# Patient Record
Sex: FEMALE | Race: WHITE | Employment: OTHER | RURAL
[De-identification: names, ages, dates, MRNs, and addresses within clinical notes are randomized per-mention and may not be internally consistent; named-entity substitution may affect disease eponyms.]

---

## 2017-08-24 ENCOUNTER — OFFICE VISIT (OUTPATIENT)
Dept: CARDIOLOGY CLINIC | Age: 62
End: 2017-08-24

## 2017-08-24 VITALS
HEART RATE: 61 BPM | OXYGEN SATURATION: 98 % | HEIGHT: 67 IN | WEIGHT: 233 LBS | RESPIRATION RATE: 18 BRPM | BODY MASS INDEX: 36.57 KG/M2 | DIASTOLIC BLOOD PRESSURE: 82 MMHG | SYSTOLIC BLOOD PRESSURE: 124 MMHG

## 2017-08-24 DIAGNOSIS — K21.00 REFLUX ESOPHAGITIS: ICD-10-CM

## 2017-08-24 DIAGNOSIS — I10 ESSENTIAL HYPERTENSION, BENIGN: ICD-10-CM

## 2017-08-24 DIAGNOSIS — E78.2 MIXED HYPERLIPIDEMIA: ICD-10-CM

## 2017-08-24 DIAGNOSIS — I25.10 ATHEROSCLEROSIS OF NATIVE CORONARY ARTERY OF NATIVE HEART WITHOUT ANGINA PECTORIS: Primary | ICD-10-CM

## 2017-08-24 DIAGNOSIS — Z98.61 S/P PTCA (PERCUTANEOUS TRANSLUMINAL CORONARY ANGIOPLASTY): ICD-10-CM

## 2017-08-24 RX ORDER — ACYCLOVIR 400 MG/1
400 TABLET ORAL 2 TIMES DAILY
COMMUNITY

## 2017-08-24 NOTE — PROGRESS NOTES
Verified patient with two patient identifiers. Medications reviewed/approved by Dr. Natali Quintanilla.

## 2017-08-24 NOTE — MR AVS SNAPSHOT
Visit Information Date & Time Provider Department Dept. Phone Encounter #  
 8/24/2017 10:00 AM Jessica Roth, [address] [place] [address] [facility] [phone] [id number] Follow-up Instructions Return in about 1 year (around 8/24/2018). Follow-up and Disposition History Upcoming Health Maintenance Date Due Hepatitis C Screening 1955 DTaP/Tdap/Td series (1 - Tdap) 10/20/1976 PAP AKA CERVICAL CYTOLOGY 10/20/1976 BREAST CANCER SCRN MAMMOGRAM 10/20/2005 FOBT Q 1 YEAR AGE 50-75 10/20/2005 ZOSTER VACCINE AGE 60> 8/20/2015 INFLUENZA AGE 9 TO ADULT 8/1/2017 Allergies as of 8/24/2017  Review Complete On: 8/24/2017 By: Jessica Roth MD  
 No Known Allergies Current Immunizations  Never Reviewed No immunizations on file. Not reviewed this visit You Were Diagnosed With   
  
 Codes Comments Atherosclerosis of native coronary artery of native heart without angina pectoris    -  Primary ICD-10-CM: I25.10 ICD-9-CM: 414.01 Essential hypertension, benign     ICD-10-CM: I10 
ICD-9-CM: 401.1 Mixed hyperlipidemia     ICD-10-CM: E78.2 ICD-9-CM: 272.2 S/P PTCA (percutaneous transluminal coronary angioplasty)     ICD-10-CM: Z98.61 ICD-9-CM: V45.82 Reflux esophagitis     ICD-10-CM: K21.0 ICD-9-CM: 530.11 Vitals BP Pulse Resp Height(growth percentile) Weight(growth percentile) SpO2  
 124/82 (BP 1 Location: Left arm, BP Patient Position: Sitting) 61 18 5' 7\" (1.702 m) 233 lb (105.7 kg) 98% BMI Smoking Status 36.49 kg/m2 Former Smoker Vitals History BMI and BSA Data Body Mass Index Body Surface Area  
 36.49 kg/m 2 2.24 m 2 [facility]erred Pharm[facility] Name Phone [facility] [phone] Your Updated Medication List  
  
   
This list is accurate as of: 8/24/17 10:42 AM.  Always use your most recent med list.  
  
  
  
  
 acyclovir 400 mg tablet Commonly known as:  ZOVIRAX Take 400 mg by mouth two (2) times a day. aspirin delayed-release 81 mg tablet Take  by mouth daily. CALCIUM 600 + D(3) PO Take  by mouth. HELIOCARE PO Take  by mouth. hydroxychloroquine 200 mg tablet Commonly known as:  PLAQUENIL Take 200 mg by mouth two (2) times a day. LIPITOR 40 mg tablet Generic drug:  atorvastatin Take 60 mg by mouth daily. metoprolol succinate 25 mg XL tablet Commonly known as:  TOPROL-XL Take  by mouth daily. multivitamin tablet Commonly known as:  ONE A DAY Take 1 Tab by mouth daily. OMEGA 3 PO Take 1,200 mg by mouth four (4) times daily. PRILOSEC PO Take 20 mg by mouth. VITAMIN D3 1,000 unit Cap Generic drug:  cholecalciferol Take  by mouth daily. ZETIA 10 mg tablet Generic drug:  ezetimibe Take  by mouth. ZOLOFT 50 mg tablet Generic drug:  sertraline Take  by mouth daily. We Performed the Following AMB POC EKG ROUTINE W/ 12 LEADS, INTER & REP [38347 CPT(R)] Follow-up Instructions Return in about 1 year (around 8/24/2018). Introducing [facility]! Karely Talamantes introduces [employer] patient portal. Now you can access parts of your medical record, email your doctor's office, and request medication refills online. 1. In your internet browser, go to [email or web address] 2. Click on the First Time User? Click Here link in the Sign In box. You will see the New Member Sign Up page. 3. Enter your [employer] Access Code exactly as it appears below. You will not need to use this code after youve completed the sign-up process. If you do not sign up before the expiration date, you must request a new code. · [employer] Access Code: [email or web address] Expires: 9/17/2017  1:49 PM 
 
4.  Enter the last four digits of your Social Security Number (xxxx) and Date of Birth (mm/dd/yyyy) as indicated and click Submit. You will be taken to the next sign-up page. 5. Create a [employer] ID. This will be your [employer] login ID and cannot be changed, so think of one that is secure and easy to remember. 6. Create a [employer] password. You can change your password at any time. 7. Enter your Password Reset Question and Answer. This can be used at a later time if you forget your password. 8. Enter your e-mail address. You will receive e-mail notification when new information is available in [address] 9. Click Sign Up. You can now view and download portions of your medical record. 10. Click the Download Summary menu link to download a portable copy of your medical information. If you have questions, please visit the Frequently Asked Questions section of the [employer] website. Remember, [employer] is NOT to be used for urgent needs. For medical emergencies, dial 911. Now available from your iPhone and Android! Please provide this summary of care documentation to your next provider. Your primary care clinician is listed as [facility] B [facility]. If you have any questions after today's visit, please call [phone].

## 2017-08-24 NOTE — PROGRESS NOTES
Marlon Coppola is a 64 y.o. female is here for routine f/u. No CV sx or complaints. Continues to see PCP. S/p cath/PCI to RCA 2003, doing well since without any CV sx or complaints. Lipids at target--5/17/17 chol 117, HDL 52, LDL 40, , CMP and CBC normal.  Last stress MPI 2006 normal.  Stress treadmill EKG 9/2/16 Dinh 6:53,  (89% apm), no sx/EKG changes. The patient denies chest pain/ shortness of breath, orthopnea, PND, LE edema, palpitations, syncope, presyncope or fatigue. Patient Active Problem List    Diagnosis Date Noted    Reflux esophagitis 03/22/2011    Mixed hyperlipidemia 03/22/2011    Essential hypertension, benign 03/22/2011    Coronary atherosclerosis of native coronary artery 03/22/2011 June B MD Rob  Past Medical History:   Diagnosis Date    Coronary atherosclerosis of native coronary artery 3/22/2011    Essential hypertension, benign 3/22/2011    GERD (gastroesophageal reflux disease)     Mixed hyperlipidemia 3/22/2011    Reflux esophagitis 3/22/2011      Past Surgical History:   Procedure Laterality Date    HX HEART CATHETERIZATION  2003    RCA >90% stenosis    HX PTCA  2003    stent to RCA. Hepacoat stent    STRESS TEST MYOVIEW  2006    no fixed or reversible deficits. Gated EF (%): 65.      No Known Allergies   No family history on file. Social History     Social History    Marital status:      Spouse name: N/A    Number of children: N/A    Years of education: N/A     Occupational History    Not on file. Social History Main Topics    Smoking status: Former Smoker     Quit date: 6/7/1990    Smokeless tobacco: Not on file    Alcohol use Not on file    Drug use: Not on file    Sexual activity: Not on file     Other Topics Concern    Not on file     Social History Narrative      Current Outpatient Prescriptions   Medication Sig    acyclovir (ZOVIRAX) 400 mg tablet Take 400 mg by mouth two (2) times a day.     aspirin delayed-release 81 mg tablet Take  by mouth daily.  CALCIUM CARBONATE/VITAMIN D3 (CALCIUM 600 + D,3, PO) Take  by mouth.  cholecalciferol (VITAMIN D3) 1,000 unit cap Take  by mouth daily.  multivitamin (ONE A DAY) tablet Take 1 Tab by mouth daily.  hydroxychloroquine (PLAQUENIL) 200 mg tablet Take 200 mg by mouth two (2) times a day.  atorvastatin (LIPITOR) 40 mg tablet Take 60 mg by mouth daily.  POLYPODIUM LEUCOTOMOS EXTRACT (HELIOCARE PO) Take  by mouth.  metoprolol-XL (TOPROL-XL) 25 mg XL tablet Take  by mouth daily.  ezetimibe (ZETIA) 10 mg tablet Take  by mouth.  OMEGA-3 FATTY ACIDS (OMEGA 3 PO) Take 1,200 mg by mouth four (4) times daily.  sertraline (ZOLOFT) 50 mg tablet Take  by mouth daily.  OMEPRAZOLE (PRILOSEC PO) Take 20 mg by mouth. No current facility-administered medications for this visit. Review of Symptoms:    CONST  No weight change. No fever, chills, sweats    ENT No visual changes, URI sx, sore throat    CV  See HPI   RESP  No cough, or sputum, wheezing. Also see HPI   GI  No abdominal pain or change in bowel habits. No heartburn or dysphagia. No melena or rectal bleeding.   No dysuria, urgency, frequency, hematuria   MSKEL  No joint pain, swelling. No muscle pain. SKIN  No rash or lesions. NEURO  No headache, syncope, or seizure. No weakness, loss of sensation, or paresthesias. PSYCH  No low mood or depression  No anxiety. HE/LYMPH  No easy bruising, abnormal bleeding, or enlarged glands.         Physical ExamPhysical Exam:    Visit Vitals    /82 (BP 1 Location: Left arm, BP Patient Position: Sitting)    Pulse 61    Resp 18    Ht 5' 7\" (1.702 m)    Wt 233 lb (105.7 kg)    SpO2 98%    BMI 36.49 kg/m2     Gen: NAD  HEENT:  PERRL, throat clear  Neck: no adenopathy, no thyromegaly, no JVD   Heart:  Regular,Nl S1S2,  no murmur, gallop or rub.   Lungs:  clear  Abdomen:   Soft, non-tender, bowel sounds are active.   Extremities:  No edema  Pulse: symmetric  Neuro: A&O times 3, No focal neuro deficits    Cardiographics    ECG: sinus zaida 51, wnl    Labs:   Lab Results   Component Value Date/Time    Sodium 142 06/13/2017 09:34 AM    Potassium 4.4 06/13/2017 09:34 AM    Chloride 106 06/13/2017 09:34 AM    CO2 28 06/13/2017 09:34 AM    Anion gap 8 06/13/2017 09:34 AM    Glucose 103 06/13/2017 09:34 AM    BUN 16 06/13/2017 09:34 AM    Creatinine 0.97 06/13/2017 09:34 AM    BUN/Creatinine ratio 16 06/13/2017 09:34 AM    GFR est AA >60 06/13/2017 09:34 AM    GFR est non-AA 58 06/13/2017 09:34 AM    Calcium 8.8 06/13/2017 09:34 AM    Bilirubin, total 0.5 06/13/2017 09:34 AM    AST (SGOT) 20 06/13/2017 09:34 AM    Alk. phosphatase 68 06/13/2017 09:34 AM    Protein, total 7.4 06/13/2017 09:34 AM    Albumin 3.7 06/13/2017 09:34 AM    Globulin 3.7 06/13/2017 09:34 AM    A-G Ratio 1.0 06/13/2017 09:34 AM    ALT (SGPT) 33 06/13/2017 09:34 AM         Assessment:         Patient Active Problem List    Diagnosis Date Noted    Reflux esophagitis 03/22/2011    Mixed hyperlipidemia 03/22/2011    Essential hypertension, benign 03/22/2011    Coronary atherosclerosis of native coronary artery 03/22/2011     . No CV sx or complaints. Continues to see PCP. S/p cath/PCI to RCA 2003, doing well since without any CV sx or complaints. Lipids at target--5/17/17 chol 117, HDL 52, LDL 40, , CMP and CBC normal.  Last stress MPI 2006 normal.  Stress treadmill EKG 9/2/16 Dinh 6:53,  (89% apm), no sx/EKG changes. Plan:     Doing well with no adverse cardiac symptoms. Lipids and labs followed by PCP. Continue current care and f/u in 12 months.     Lina Burton MD

## 2018-08-24 ENCOUNTER — OFFICE VISIT (OUTPATIENT)
Dept: CARDIOLOGY CLINIC | Age: 63
End: 2018-08-24

## 2018-08-24 VITALS
BODY MASS INDEX: 36.26 KG/M2 | HEART RATE: 49 BPM | RESPIRATION RATE: 18 BRPM | OXYGEN SATURATION: 97 % | SYSTOLIC BLOOD PRESSURE: 110 MMHG | HEIGHT: 67 IN | DIASTOLIC BLOOD PRESSURE: 80 MMHG | WEIGHT: 231 LBS

## 2018-08-24 DIAGNOSIS — Z98.61 S/P PTCA (PERCUTANEOUS TRANSLUMINAL CORONARY ANGIOPLASTY): ICD-10-CM

## 2018-08-24 DIAGNOSIS — E78.2 MIXED HYPERLIPIDEMIA: ICD-10-CM

## 2018-08-24 DIAGNOSIS — K21.00 REFLUX ESOPHAGITIS: ICD-10-CM

## 2018-08-24 DIAGNOSIS — I25.10 ATHEROSCLEROSIS OF NATIVE CORONARY ARTERY OF NATIVE HEART WITHOUT ANGINA PECTORIS: Primary | ICD-10-CM

## 2018-08-24 DIAGNOSIS — I10 ESSENTIAL HYPERTENSION, BENIGN: ICD-10-CM

## 2018-08-24 NOTE — PROGRESS NOTES
Rudi Montenegro is a 58 y.o. female is here for routine f/u. No CV sx or complaints. Continues to see PCP. S/p cath/PCI to RCA 2003, doing well since without any CV sx or complaints.  Lipids at target--5/17/17 chol 117, HDL 52, LDL 40, , CMP and CBC normal.  Last stress MPI 2006 normal.  Stress treadmill EKG 9/2/16 Dinh 6:53,  (89% apm), no sx/EKG changes. Last seen here a year ago, continues to see Dr. Refugio Bowden. Labs 5/23/18 with CBC, CMP normal, [id number], LDL38, HDL58, . The patient denies chest pain/ shortness of breath, orthopnea, PND, LE edema, palpitations, syncope, presyncope or fatigue. Patient Active Problem List    Diagnosis Date Noted    Reflux esophagitis 03/22/2011    Mixed hyperlipidemia 03/22/2011    Essential hypertension, benign 03/22/2011    Coronary atherosclerosis of native coronary artery 03/22/2011 June Goyo Hebert MD  Past Medical History:   Diagnosis Date    Coronary atherosclerosis of native coronary artery 3/22/2011    Essential hypertension, benign 3/22/2011    GERD (gastroesophageal reflux disease)     Menopause     age 46    Mixed hyperlipidemia 3/22/2011    Reflux esophagitis 3/22/2011      Past Surgical History:   Procedure Laterality Date    HX BREAST BIOPSY Left     2002    HX HEART CATHETERIZATION  2003    RCA >90% stenosis    HX PARTIAL HYSTERECTOMY      age 39    HX PTCA  2003    stent to RCA. Hepacoat stent    STRESS TEST MYOVIEW  2006    no fixed or reversible deficits. Gated EF (%): 65.      No Known Allergies   No family history on file. Social History     Social History    Marital status:      Spouse name: N/A    Number of children: N/A    Years of education: N/A     Occupational History    Not on file.      Social History Main Topics    Smoking status: Former Smoker     Quit date: 6/7/1985    Smokeless tobacco: Never Used    Alcohol use No    Drug use: No    Sexual activity: Not Currently     Other Topics Concern    Not on file     Social History Narrative      Current Outpatient Prescriptions   Medication Sig    HYDROcodone-acetaminophen (NORCO) 5-325 mg per tablet Take 1 Tab by mouth every four (4) hours as needed for Pain. Max Daily Amount: 6 Tabs.  acyclovir (ZOVIRAX) 400 mg tablet Take 400 mg by mouth two (2) times a day.  aspirin delayed-release 81 mg tablet Take  by mouth daily.  CALCIUM CARBONATE/VITAMIN D3 (CALCIUM 600 + D,3, PO) Take  by mouth.  cholecalciferol (VITAMIN D3) 1,000 unit cap Take  by mouth daily.  multivitamin (ONE A DAY) tablet Take 1 Tab by mouth daily.  hydroxychloroquine (PLAQUENIL) 200 mg tablet Take 200 mg by mouth daily.  atorvastatin (LIPITOR) 40 mg tablet Take 60 mg by mouth daily.  POLYPODIUM LEUCOTOMOS EXTRACT (HELIOCARE PO) Take  by mouth.  metoprolol-XL (TOPROL-XL) 25 mg XL tablet Take  by mouth nightly.  ezetimibe (ZETIA) 10 mg tablet Take  by mouth.  OMEGA-3 FATTY ACIDS (OMEGA 3 PO) Take 2,400 mg by mouth two (2) times a day.  sertraline (ZOLOFT) 50 mg tablet Take  by mouth daily.  OMEPRAZOLE (PRILOSEC PO) Take 20 mg by mouth. No current facility-administered medications for this visit. Review of Symptoms:    CONST  No weight change. No fever, chills, sweats    ENT No visual changes, URI sx, sore throat    CV  See HPI   RESP  No cough, or sputum, wheezing. Also see HPI   GI  No abdominal pain or change in bowel habits. No heartburn or dysphagia. No melena or rectal bleeding.   No dysuria, urgency, frequency, hematuria   MSKEL  No joint pain, swelling. No muscle pain. SKIN  No rash or lesions. NEURO  No headache, syncope, or seizure. No weakness, loss of sensation, or paresthesias. PSYCH  No low mood or depression  No anxiety. HE/LYMPH  No easy bruising, abnormal bleeding, or enlarged glands. Physical ExamPhysical Exam:    There were no vitals taken for this visit.   Gen: NAD  HEENT:  PERSHALONDA throat clear  Neck: no adenopathy, no thyromegaly, no JVD   Heart:  Regular,Nl S1S2,  no murmur, gallop or rub.   Lungs:  clear  Abdomen:   Soft, non-tender, bowel sounds are active.   Extremities:  No edema  Pulse: symmetric  Neuro: A&O times 3, No focal neuro deficits    Cardiographics    ECG: NSR, no acute changes    Labs:   Lab Results   Component Value Date/Time    Sodium 140 04/10/2018 09:35 AM    Sodium 141 11/09/2017 08:44 AM    Sodium 142 06/13/2017 09:34 AM    Potassium 4.4 04/10/2018 09:35 AM    Potassium 4.8 11/09/2017 08:44 AM    Potassium 4.4 06/13/2017 09:34 AM    Chloride 104 04/10/2018 09:35 AM    Chloride 105 11/09/2017 08:44 AM    Chloride 106 06/13/2017 09:34 AM    CO2 25 04/10/2018 09:35 AM    CO2 28 11/09/2017 08:44 AM    CO2 28 06/13/2017 09:34 AM    Anion gap 11 04/10/2018 09:35 AM    Anion gap 8 11/09/2017 08:44 AM    Anion gap 8 06/13/2017 09:34 AM    Glucose 102 (H) 04/10/2018 09:35 AM    Glucose 103 (H) 11/09/2017 08:44 AM    Glucose 103 (H) 06/13/2017 09:34 AM    BUN 19 (H) 04/10/2018 09:35 AM    BUN 12 11/09/2017 08:44 AM    BUN 16 06/13/2017 09:34 AM    Creatinine 0.85 04/10/2018 09:35 AM    Creatinine 0.89 11/09/2017 08:44 AM    Creatinine 0.97 06/13/2017 09:34 AM    BUN/Creatinine ratio 22 (H) 04/10/2018 09:35 AM    BUN/Creatinine ratio 13 11/09/2017 08:44 AM    BUN/Creatinine ratio 16 06/13/2017 09:34 AM    GFR est AA >60 04/10/2018 09:35 AM    GFR est AA >60 11/09/2017 08:44 AM    GFR est AA >60 06/13/2017 09:34 AM    GFR est non-AA >60 04/10/2018 09:35 AM    GFR est non-AA >60 11/09/2017 08:44 AM    GFR est non-AA 58 (L) 06/13/2017 09:34 AM    Calcium 8.9 04/10/2018 09:35 AM    Calcium 9.2 11/09/2017 08:44 AM    Calcium 8.8 06/13/2017 09:34 AM    Bilirubin, total 0.5 04/10/2018 09:35 AM    Bilirubin, total 0.5 11/09/2017 08:44 AM    Bilirubin, total 0.5 06/13/2017 09:34 AM    AST (SGOT) 18 04/10/2018 09:35 AM    AST (SGOT) 20 11/09/2017 08:44 AM    AST (SGOT) 20 06/13/2017 09:34 AM    Alk. phosphatase 73 04/10/2018 09:35 AM    Alk. phosphatase 71 11/09/2017 08:44 AM    Alk. phosphatase 68 06/13/2017 09:34 AM    Protein, total 7.6 04/10/2018 09:35 AM    Protein, total 7.3 11/09/2017 08:44 AM    Protein, total 7.4 06/13/2017 09:34 AM    Albumin 3.9 04/10/2018 09:35 AM    Albumin 3.8 11/09/2017 08:44 AM    Albumin 3.7 06/13/2017 09:34 AM    Globulin 3.7 04/10/2018 09:35 AM    Globulin 3.5 11/09/2017 08:44 AM    Globulin 3.7 06/13/2017 09:34 AM    A-G Ratio 1.1 04/10/2018 09:35 AM    A-G Ratio 1.1 11/09/2017 08:44 AM    A-G Ratio 1.0 (L) 06/13/2017 09:34 AM    ALT (SGPT) 38 04/10/2018 09:35 AM    ALT (SGPT) 32 11/09/2017 08:44 AM    ALT (SGPT) 33 06/13/2017 09:34 AM     No results found for: CPK, CPKX, CPX  No results found for: CHOL, CHOLX, CHLST, CHOLV, 974125, HDL, LDL, LDLC, DLDLP, TGLX, TRIGL, TRIGP, CHHD, CHHDX  No results found for this or any previous visit. Assessment:         Patient Active Problem List    Diagnosis Date Noted    Reflux esophagitis 03/22/2011    Mixed hyperlipidemia 03/22/2011    Essential hypertension, benign 03/22/2011    Coronary atherosclerosis of native coronary artery 03/22/2011     No CV sx or complaints. Continues to see PCP. S/p cath/PCI to RCA 2003, doing well since without any CV sx or complaints.  Lipids at target--5/17/17 chol 117, HDL 52, LDL 40, , CMP and CBC normal.  Last stress MPI 2006 normal.  Stress treadmill EKG 9/2/16 Dinh 6:53,  (89% apm), no sx/EKG changes. Plan:     Doing well with no adverse cardiac symptoms. Bradycardic but feels great, so not concerned  Can reduce the Atorvastatin to 40mg (now on 60mg), fishoil to 2 caps/day  Lipids and labs followed by PCP. Continue current care and f/u in 12 months.     Hellen Bailey MD

## 2018-08-24 NOTE — MR AVS SNAPSHOT
[address] [facility] 
 
 
 [address] [facility] [address] [address] [phone] Patient: Deloris Samayoa MRN: [id number] KCA:[id number] Visit Information Date & Time Provider Department Dept. Phone Encounter #  
 8/24/2018  9:20 AM Marv Rahman, [address] [facility] [phone] [id number] Follow-up Instructions Return in about 1 year (around 8/24/2019). Follow-up and Disposition History Your Appointments 3/4/2019 10:40 AM  
ESTABLISHED PATIENT with Marv Rahman MD  
[address] [facility]) Appt Note: 6 mo fu $cp  
 [address] [facility] [address] [phone]  
  
   
 [address] [place] [address] Upcoming Health Maintenance Date Due Hepatitis C Screening 1955 DTaP/Tdap/Td series (1 - Tdap) 10/20/1976 PAP AKA CERVICAL CYTOLOGY 10/20/1976 FOBT Q 1 YEAR AGE 50-75 10/20/2005 ZOSTER VACCINE AGE 60> 8/20/2015 Influenza Age 5 to Adult 8/1/2018 BREAST CANCER SCRN MAMMOGRAM 11/9/2019 Allergies as of 8/24/2018  Review Complete On: 8/24/2018 By: Aguila Sam LPN No Known Allergies Current Immunizations  Never Reviewed No immunizations on file. Not reviewed this visit You Were Diagnosed With   
  
 Codes Comments Atherosclerosis of native coronary artery of native heart without angina pectoris    -  Primary ICD-10-CM: I25.10 ICD-9-CM: 414.01 Essential hypertension, benign     ICD-10-CM: I10 
ICD-9-CM: 401.1 Mixed hyperlipidemia     ICD-10-CM: E78.2 ICD-9-CM: 272.2 S/P PTCA (percutaneous transluminal coronary angioplasty)     ICD-10-CM: Z98.61 ICD-9-CM: V45.82 Reflux esophagitis     ICD-10-CM: K21.0 ICD-9-CM: 530.11 Vitals BP Pulse Resp Height(growth percentile) Weight(growth percentile) SpO2 110/80 (BP 1 Location: Left arm, BP Patient Position: Sitting) (!) 49 18 5' 7\" (1.702 m) 231 lb (104.8 kg) 97% BMI OB Status Smoking Status 36.18 kg/m2 Hysterectomy Former Smoker Vitals History BMI and BSA Data Body Mass Index Body Surface Area  
 36.18 kg/m 2 2.23 m 2 Preferred Pharmacy Pharmacy Name Phone [facility], [facility] [phone] Your Updated Medication List  
  
   
This list is accurate as of 8/24/18 10:15 AM.  Always use your most recent med list.  
  
  
  
  
 acyclovir 400 mg tablet Commonly known as:  ZOVIRAX Take 400 mg by mouth two (2) times a day. aspirin delayed-release 81 mg tablet Take  by mouth daily. CALCIUM 600 + D(3) PO Take  by mouth. HELIOCARE PO Take  by mouth. hydroxychloroquine 200 mg tablet Commonly known as:  PLAQUENIL Take 200 mg by mouth daily. LIPITOR 40 mg tablet Generic drug:  atorvastatin Take 60 mg by mouth daily. metoprolol succinate 25 mg XL tablet Commonly known as:  TOPROL-XL Take  by mouth nightly. multivitamin tablet Commonly known as:  ONE A DAY Take 1 Tab by mouth daily. OMEGA 3 PO Take 2,400 mg by mouth two (2) times a day. PRILOSEC PO Take 20 mg by mouth. VITAMIN D3 1,000 unit Cap Generic drug:  cholecalciferol Take 2,000 Units by mouth daily. ZETIA 10 mg tablet Generic drug:  ezetimibe Take  by mouth. ZOLOFT 50 mg tablet Generic drug:  sertraline Take  by mouth daily. We Performed the Following AMB POC EKG ROUTINE W/ 12 LEADS, INTER & REP [02533 CPT(R)] Follow-up Instructions Return in about 1 year (around 8/24/2019). Patient Instructions Can reduce the Atorvastatin to 40mg (now on 60mg), fishoil to 2 caps/day Introducing [facility] & HEALTH SERVICES! [employer] introduces [employer] patient portal. Now you can access parts of your medical record, email your doctor's office, and request medication refills online. 1. In your internet browser, go to [email or web address] 2. Click on the First Time User? Click Here link in the Sign In box. You will see the New Member Sign Up page. 3. Enter your [employer] Access Code exactly as it appears below. You will not need to use this code after youve completed the sign-up process. If you do not sign up before the expiration date, you must request a new code. · [employer] Access Code: [email or web address] Expires: 11/22/2018 10:13 AM 
 
4. Enter the last four digits of your Social Security Number (xxxx) and Date of Birth (mm/dd/yyyy) as indicated and click Submit. You will be taken to the next sign-up page. 5. Create a [employer] ID. This will be your [employer] login ID and cannot be changed, so think of one that is secure and easy to remember. 6. Create a [employer] password. You can change your password at any time. 7. Enter your Password Reset Question and Answer. This can be used at a later time if you forget your password. 8. Enter your e-mail address. You will receive e-mail notification when new information is available in [address] 9. Click Sign Up. You can now view and download portions of your medical record. 10. Click the Download Summary menu link to download a portable copy of your medical information. If you have questions, please visit the Frequently Asked Questions section of the [employer] website. Remember, [employer] is NOT to be used for urgent needs. For medical emergencies, dial 911. Now available from your iPhone and Android! Please provide this summary of care documentation to your next provider. Your primary care clinician is listed as June B Daffe. If you have any questions after today's visit, please call [phone].

## 2018-08-24 NOTE — PROGRESS NOTES
Verified patient with two patient identifiers. Medications reviewed/approved by Dr. Arabella Grier. Verbal from Dr. Arabella Grier to remove the medications that were deleted during the visit. Medication(s) removed: Hydrocodone    Chief Complaint   Patient presents with    Coronary Artery Disease     6 month follow up    Hypertension    Cholesterol Problem     1. Have you been to the ER, urgent care clinic since your last visit? Hospitalized since your last visit? 10/13/2017 [facility] er sprain ankle    2. Have you seen or consulted any other health care providers outside of the [facility] since your last visit? Include any pap smears or colon screening. Yes, Dr. Daniel Delatorre - pcp - last seen May  2018 for routine care and labs. Dr. Kenya Shelby - dermatology for lupus - last seen 2 weeks ago. Dr. Td Gamino - opthalmologist - last seen April 2018 for routine.

## 2019-08-28 ENCOUNTER — OFFICE VISIT (OUTPATIENT)
Dept: CARDIOLOGY CLINIC | Age: 64
End: 2019-08-28

## 2019-08-28 VITALS
OXYGEN SATURATION: 98 % | BODY MASS INDEX: 34.21 KG/M2 | WEIGHT: 218 LBS | SYSTOLIC BLOOD PRESSURE: 132 MMHG | RESPIRATION RATE: 14 BRPM | HEIGHT: 67 IN | DIASTOLIC BLOOD PRESSURE: 78 MMHG | HEART RATE: 50 BPM

## 2019-08-28 DIAGNOSIS — I10 ESSENTIAL HYPERTENSION, BENIGN: ICD-10-CM

## 2019-08-28 DIAGNOSIS — K21.00 REFLUX ESOPHAGITIS: ICD-10-CM

## 2019-08-28 DIAGNOSIS — R00.1 BRADYCARDIA: ICD-10-CM

## 2019-08-28 DIAGNOSIS — Z98.61 S/P PTCA (PERCUTANEOUS TRANSLUMINAL CORONARY ANGIOPLASTY): ICD-10-CM

## 2019-08-28 DIAGNOSIS — E78.2 MIXED HYPERLIPIDEMIA: ICD-10-CM

## 2019-08-28 DIAGNOSIS — I25.10 ATHEROSCLEROSIS OF NATIVE CORONARY ARTERY OF NATIVE HEART WITHOUT ANGINA PECTORIS: Primary | ICD-10-CM

## 2019-08-28 RX ORDER — LOSARTAN POTASSIUM 25 MG/1
25 TABLET ORAL DAILY
Qty: 90 TAB | Refills: 1 | Status: SHIPPED | OUTPATIENT
Start: 2019-08-28

## 2019-08-28 NOTE — PROGRESS NOTES
PATIENT ID VERIFIED WITH TWO PATIENT IDENTIFIERS. PATIENT MEDICATIONS REVIEWED AND APPROVED BY DR. Stanley Ozuna. MEDICATIONS THAT WERE REMOVED FROM THIS VISIT HAVE BEEN APPROVED BY DR. Stanley Ozuna. REMOVED:  NIYA  Chief Complaint   Patient presents with    Coronary Artery Disease     One year follow up    Hypertension    Cholesterol Problem       1. Have you been to the ER, urgent care clinic since your last visit? Hospitalized since your last visit? [facility] December 2018 for vomiting/diarrhea    2. Have you seen or consulted any other health care providers outside of the [address] [place] [address] since your last visit? Include any pap smears or colon screening.  PCP Dr. Brooke Canseco June 2019 for routine follow up

## 2019-08-28 NOTE — PROGRESS NOTES
Anders Roberto is a 61 y.o. female is here for routine f/u. No CV sx or complaints.  Continues to see PCP. S/p cath/PCI to RCA 2003, doing well since without any CV sx or complaints.  Lipids at target--6/7/19 chol 132, HDL 57, LDL 49, , CMP and CBC normal.  Last stress MPI 2006 normal.  Stress treadmill EKG 9/2/16 Dinh 6:53,  (89% apm), no sx/EKG changes.  Last seen here a year ago, continues to see Dr. Anderson Mondragon. The patient denies chest pain/ shortness of breath, orthopnea, PND, LE edema, palpitations, syncope, presyncope or fatigue. Patient Active Problem List    Diagnosis Date Noted    Reflux esophagitis 03/22/2011    Mixed hyperlipidemia 03/22/2011    Essential hypertension, benign 03/22/2011    Coronary atherosclerosis of native coronary artery 03/22/2011      Yolie Rivas MD  Past Medical History:   Diagnosis Date    Coronary atherosclerosis of native coronary artery 3/22/2011    Essential hypertension, benign 3/22/2011    GERD (gastroesophageal reflux disease)     Menopause     age 46    Mixed hyperlipidemia 3/22/2011    Reflux esophagitis 3/22/2011      Past Surgical History:   Procedure Laterality Date    HX BREAST BIOPSY Left     2002    HX HEART CATHETERIZATION  2003    RCA >90% stenosis    HX PARTIAL HYSTERECTOMY      age 39    HX PTCA  2003    stent to RCA. Hepacoat stent    STRESS TEST MYOVIEW  2006    no fixed or reversible deficits. Gated EF (%): 65.      No Known Allergies   History reviewed. No pertinent family history.    Social History     Socioeconomic History    Marital status:      Spouse name: Not on file    Number of children: Not on file    Years of education: Not on file    Highest education level: Not on file   Occupational History    Not on file   Social Needs    Financial resource strain: Not on file    Food insecurity:     Worry: Not on file     Inability: Not on file    Transportation needs:     Medical: Not on file Non-medical: Not on file   Tobacco Use    Smoking status: Former Smoker     Last attempt to quit: 1985     Years since quittin.2    Smokeless tobacco: Never Used   Substance and Sexual Activity    Alcohol use: No    Drug use: No    Sexual activity: Not Currently   Lifestyle    Physical activity:     Days per week: Not on file     Minutes per session: Not on file    Stress: Not on file   Relationships    Social connections:     Talks on phone: Not on file     Gets together: Not on file     Attends [employer] service: Not on file     Active member of club or organization: Not on file     Attends meetings of clubs or organizations: Not on file     Relationship status: Not on file    Intimate partner violence:     Fear of current or ex partner: Not on file     Emotionally abused: Not on file     Physically abused: Not on file     Forced sexual activity: Not on file   Other Topics Concern    Not on file   Social History Narrative    Not on file      Current Outpatient Medications   Medication Sig    acyclovir (ZOVIRAX) 400 mg tablet Take 400 mg by mouth two (2) times a day.  aspirin delayed-release 81 mg tablet Take  by mouth daily.  CALCIUM CARBONATE/VITAMIN D3 (CALCIUM 600 + D,3, PO) Take  by mouth.  cholecalciferol (VITAMIN D3) 1,000 unit cap Take 2,000 Units by mouth daily.  multivitamin (ONE A DAY) tablet Take 1 Tab by mouth daily.  hydroxychloroquine (PLAQUENIL) 200 mg tablet Take 200 mg by mouth daily.  atorvastatin (LIPITOR) 40 mg tablet Take 60 mg by mouth daily.  POLYPODIUM LEUCOTOMOS EXTRACT (HELIOCARE PO) Take  by mouth daily.  metoprolol-XL (TOPROL-XL) 25 mg XL tablet Take  by mouth nightly.  ezetimibe (ZETIA) 10 mg tablet Take  by mouth.  OMEGA-3 FATTY ACIDS (OMEGA 3 PO) Take 2,400 mg by mouth two (2) times a day.  sertraline (ZOLOFT) 50 mg tablet Take  by mouth daily.  OMEPRAZOLE (PRILOSEC PO) Take 20 mg by mouth daily.      No current facility-administered medications for this visit. Review of Symptoms:    CONST  No weight change. No fever, chills, sweats    ENT No visual changes, URI sx, sore throat    CV  See HPI   RESP  No cough, or sputum, wheezing. Also see HPI   GI  No abdominal pain or change in bowel habits. No heartburn or dysphagia. No melena or rectal bleeding.   No dysuria, urgency, frequency, hematuria   MSKEL  No joint pain, swelling. No muscle pain. SKIN  No rash or lesions. NEURO  No headache, syncope, or seizure. No weakness, loss of sensation, or paresthesias. PSYCH  No low mood or depression  No anxiety. HE/LYMPH  No easy bruising, abnormal bleeding, or enlarged glands.         Physical ExamPhysical Exam:    Visit Vitals  /84 (BP 1 Location: Left arm, BP Patient Position: Sitting) Comment (BP 1 Location): LARGE CUIFF   Pulse (!) 50   Resp 14   Ht 5' 7\" (1.702 m)   Wt 218 lb (98.9 kg)   SpO2 98%   BMI 34.14 kg/m²     Gen: NAD  HEENT:  PERRL, throat clear  Neck: no adenopathy, no thyromegaly, no JVD   Heart:  Regular,Nl S1S2,  no murmur, gallop or rub.   Lungs:  clear  Abdomen:   Soft, non-tender, bowel sounds are active.   Extremities:  No edema  Pulse: symmetric  Neuro: A&O times 3, No focal neuro deficits    Cardiographics    ECG: sinus zaida 45, otherwise wnl      Labs:   Lab Results   Component Value Date/Time    Sodium 141 03/05/2019 11:30 AM    Sodium 144 12/27/2018 09:48 AM    Sodium 140 04/10/2018 09:35 AM    Sodium 141 11/09/2017 08:44 AM    Sodium 142 06/13/2017 09:34 AM    Potassium 4.6 03/05/2019 11:30 AM    Potassium 3.6 12/27/2018 09:48 AM    Potassium 4.4 04/10/2018 09:35 AM    Potassium 4.8 11/09/2017 08:44 AM    Potassium 4.4 06/13/2017 09:34 AM    Chloride 105 03/05/2019 11:30 AM    Chloride 105 12/27/2018 09:48 AM    Chloride 104 04/10/2018 09:35 AM    Chloride 105 11/09/2017 08:44 AM    Chloride 106 06/13/2017 09:34 AM    CO2 28 03/05/2019 11:30 AM    CO2 23 12/27/2018 09:48 AM    CO2 25 04/10/2018 09:35 AM    CO2 28 11/09/2017 08:44 AM    CO2 28 06/13/2017 09:34 AM    Anion gap 8 03/05/2019 11:30 AM    Anion gap 16 (H) 12/27/2018 09:48 AM    Anion gap 11 04/10/2018 09:35 AM    Anion gap 8 11/09/2017 08:44 AM    Anion gap 8 06/13/2017 09:34 AM    Glucose 97 03/05/2019 11:30 AM    Glucose 142 (H) 12/27/2018 09:48 AM    Glucose 102 (H) 04/10/2018 09:35 AM    Glucose 103 (H) 11/09/2017 08:44 AM    Glucose 103 (H) 06/13/2017 09:34 AM    BUN 14 03/05/2019 11:30 AM    BUN 18 12/27/2018 09:48 AM    BUN 19 (H) 04/10/2018 09:35 AM    BUN 12 11/09/2017 08:44 AM    BUN 16 06/13/2017 09:34 AM    Creatinine 0.89 03/05/2019 11:30 AM    Creatinine 0.95 12/27/2018 09:48 AM    Creatinine 0.85 04/10/2018 09:35 AM    Creatinine 0.89 11/09/2017 08:44 AM    Creatinine 0.97 06/13/2017 09:34 AM    BUN/Creatinine ratio 16 03/05/2019 11:30 AM    BUN/Creatinine ratio 19 12/27/2018 09:48 AM    BUN/Creatinine ratio 22 (H) 04/10/2018 09:35 AM    BUN/Creatinine ratio 13 11/09/2017 08:44 AM    BUN/Creatinine ratio 16 06/13/2017 09:34 AM    GFR est AA >60 03/05/2019 11:30 AM    GFR est AA >60 12/27/2018 09:48 AM    GFR est AA >60 04/10/2018 09:35 AM    GFR est AA >60 11/09/2017 08:44 AM    GFR est AA >60 06/13/2017 09:34 AM    GFR est non-AA >60 03/05/2019 11:30 AM    GFR est non-AA 59 (L) 12/27/2018 09:48 AM    GFR est non-AA >60 04/10/2018 09:35 AM    GFR est non-AA >60 11/09/2017 08:44 AM    GFR est non-AA 58 (L) 06/13/2017 09:34 AM    Calcium 9.0 03/05/2019 11:30 AM    Calcium 9.4 12/27/2018 09:48 AM    Calcium 8.9 04/10/2018 09:35 AM    Calcium 9.2 11/09/2017 08:44 AM    Calcium 8.8 06/13/2017 09:34 AM    Bilirubin, total 0.5 03/05/2019 11:30 AM    Bilirubin, total 0.5 12/27/2018 09:48 AM    Bilirubin, total 0.5 04/10/2018 09:35 AM    Bilirubin, total 0.5 11/09/2017 08:44 AM    Bilirubin, total 0.5 06/13/2017 09:34 AM    AST (SGOT) 18 03/05/2019 11:30 AM    AST (SGOT) 24 12/27/2018 09:48 AM    AST (SGOT) 18 04/10/2018 09:35 AM    AST (SGOT) 20 11/09/2017 08:44 AM    AST (SGOT) 20 06/13/2017 09:34 AM    Alk. phosphatase 74 03/05/2019 11:30 AM    Alk. phosphatase 82 12/27/2018 09:48 AM    Alk. phosphatase 73 04/10/2018 09:35 AM    Alk. phosphatase 71 11/09/2017 08:44 AM    Alk. phosphatase 68 06/13/2017 09:34 AM    Protein, total 7.2 03/05/2019 11:30 AM    Protein, total 7.8 12/27/2018 09:48 AM    Protein, total 7.6 04/10/2018 09:35 AM    Protein, total 7.3 11/09/2017 08:44 AM    Protein, total 7.4 06/13/2017 09:34 AM    Albumin 3.9 03/05/2019 11:30 AM    Albumin 4.2 12/27/2018 09:48 AM    Albumin 3.9 04/10/2018 09:35 AM    Albumin 3.8 11/09/2017 08:44 AM    Albumin 3.7 06/13/2017 09:34 AM    Globulin 3.3 03/05/2019 11:30 AM    Globulin 3.6 12/27/2018 09:48 AM    Globulin 3.7 04/10/2018 09:35 AM    Globulin 3.5 11/09/2017 08:44 AM    Globulin 3.7 06/13/2017 09:34 AM    A-G Ratio 1.2 03/05/2019 11:30 AM    A-G Ratio 1.2 12/27/2018 09:48 AM    A-G Ratio 1.1 04/10/2018 09:35 AM    A-G Ratio 1.1 11/09/2017 08:44 AM    A-G Ratio 1.0 (L) 06/13/2017 09:34 AM    ALT (SGPT) 28 03/05/2019 11:30 AM    ALT (SGPT) 36 12/27/2018 09:48 AM    ALT (SGPT) 38 04/10/2018 09:35 AM    ALT (SGPT) 32 11/09/2017 08:44 AM    ALT (SGPT) 33 06/13/2017 09:34 AM     No results found for: CPK, CPKX, CPX  No results found for: CHOL, CHOLX, CHLST, CHOLV, 991536, HDL, LDL, LDLC, DLDLP, TGLX, TRIGL, TRIGP, CHHD, CHHDX  No results found for this or any previous visit. Assessment:         Patient Active Problem List    Diagnosis Date Noted    Reflux esophagitis 03/22/2011    Mixed hyperlipidemia 03/22/2011    Essential hypertension, benign 03/22/2011    Coronary atherosclerosis of native coronary artery 03/22/2011      No CV sx or complaints.  Continues to see PCP.  S/p cath/PCI to RCA 2003, doing well since without any CV sx or complaints.  Lipids at target--6/7/19 chol 132, HDL 57, LDL 49, , CMP and CBC normal.  Last stress MPI 2006 normal.  Stress treadmill EKG 9/2/16 Dinh 6:53,  (89% apm), no sx/EKG changes.  Last seen here a year ago, continues to see Dr. Nanci Irving. Plan:     Doing well with no adverse cardiac symptoms. Persistent sinus zaida--will stop Metoprolol and start Losartan 25mg every day to replace  Continue other meds   Lipids and labs followed by PCP. Continue current care and f/u in 12 months.     Francis Potts MD

## 2019-08-28 NOTE — LETTER
8/28/19 Patient: Houston Ireland YOB: 1955 Date of Visit: 8/28/2019 Lyndon Castano MD 
[address] [address] VIA Facsimile: [phone] Dear Lyndon Castano MD, Thank you for referring Ms. Missy Manjarrez to [facility] for evaluation. My notes for this consultation are attached. If you have questions, please do not hesitate to call me. I look forward to following your patient along with you.  
 
 
Sincerely, 
 
Yaima Larry MD

## 2019-09-24 ENCOUNTER — TELEPHONE (OUTPATIENT)
Dept: CARDIOLOGY CLINIC | Age: 64
End: 2019-09-24

## 2019-09-24 NOTE — TELEPHONE ENCOUNTER
Asked if the  is Torrent? Pt is unsure. She will call pharmacy and get back to us. Verified patient with two patient identifiers.

## 2019-09-24 NOTE — TELEPHONE ENCOUNTER
Patient called due to a Voluntary Recall on Losartan Potassium. He just put her on this medication and she wants to know what she needs to do.   Please call [phone]

## 2020-06-08 ENCOUNTER — OFFICE VISIT (OUTPATIENT)
Dept: SURGERY | Age: 65
End: 2020-06-08

## 2020-06-08 VITALS
SYSTOLIC BLOOD PRESSURE: 147 MMHG | RESPIRATION RATE: 14 BRPM | TEMPERATURE: 97.1 F | HEIGHT: 67 IN | HEART RATE: 58 BPM | DIASTOLIC BLOOD PRESSURE: 74 MMHG | OXYGEN SATURATION: 100 % | BODY MASS INDEX: 34.53 KG/M2 | WEIGHT: 220 LBS

## 2020-06-08 DIAGNOSIS — Z12.11 COLON CANCER SCREENING: Primary | ICD-10-CM

## 2020-06-08 NOTE — PATIENT INSTRUCTIONS

## 2020-06-08 NOTE — PROGRESS NOTES
Suresh Price is a 59 y.o. female who presents today with the following:  Chief Complaint   Patient presents with    Colon Cancer Screening       HPI    57-year-old female who presents as a referral by Dr. Juventino Messer for screening colonoscopy. Her last colonoscopy was in 2009 and she believes it was normal.  She has no family history of colon cancer. She denies any unexpected weight loss. She denies any melena or hematochezia. She denies any diarrhea or constipation or abdominal pain on a regular basis. She has had an abdominal hysterectomy for fibroids. She has had no other prior abdominal surgeries. She does take an 81 mg aspirin a day and has been taking this since 2003 at which point she had a cardiac stent placed but she states that she did not have an MRI. She used to smoke but stopped 25 years ago and denies any alcohol. Past Medical History:   Diagnosis Date    Coronary atherosclerosis of native coronary artery 3/22/2011    Essential hypertension, benign 3/22/2011    GERD (gastroesophageal reflux disease)     Menopause     age 46    Mixed hyperlipidemia 3/22/2011    Reflux esophagitis 3/22/2011       Past Surgical History:   Procedure Laterality Date    HX BREAST BIOPSY Left     2002    HX COLONOSCOPY  2009    normal    HX HEART CATHETERIZATION  2003    RCA >90% stenosis    HX PARTIAL HYSTERECTOMY      age 39    HX PTCA  2003    stent to RCA. Hepacoat stent    STRESS TEST MYOVIEW  2006    no fixed or reversible deficits.  Gated EF (%): 65.        Social History     Socioeconomic History    Marital status:      Spouse name: Not on file    Number of children: Not on file    Years of education: Not on file    Highest education level: Not on file   Occupational History    Not on file   Social Needs    Financial resource strain: Not on file    Food insecurity     Worry: Not on file     Inability: Not on file    Transportation needs     Medical: Not on file     Non-medical: Not on file   Tobacco Use    Smoking status: Former Smoker     Last attempt to quit: 1985     Years since quittin.0    Smokeless tobacco: Never Used   Substance and Sexual Activity    Alcohol use: No    Drug use: No    Sexual activity: Not Currently   Lifestyle    Physical activity     Days per week: Not on file     Minutes per session: Not on file    Stress: Not on file   Relationships    Social connections     Talks on phone: Not on file     Gets together: Not on file     Attends [employer] service: Not on file     Active member of club or organization: Not on file     Attends meetings of clubs or organizations: Not on file     Relationship status: Not on file    Intimate partner violence     Fear of current or ex partner: Not on file     Emotionally abused: Not on file     Physically abused: Not on file     Forced sexual activity: Not on file   Other Topics Concern    Not on file   Social History Narrative    Not on file       Family History   Problem Relation Age of Onset    No Known Problems Mother        No Known Allergies    Current Outpatient Medications   Medication Sig    losartan (COZAAR) 25 mg tablet Take 1 Tab by mouth daily.  acyclovir (ZOVIRAX) 400 mg tablet Take 400 mg by mouth two (2) times a day.  aspirin delayed-release 81 mg tablet Take  by mouth daily.  CALCIUM CARBONATE/VITAMIN D3 (CALCIUM 600 + D,3, PO) Take  by mouth.  cholecalciferol (VITAMIN D3) 1,000 unit cap Take 2,000 Units by mouth daily.  multivitamin (ONE A DAY) tablet Take 1 Tab by mouth daily.  hydroxychloroquine (PLAQUENIL) 200 mg tablet Take 200 mg by mouth daily.  atorvastatin (LIPITOR) 40 mg tablet Take 60 mg by mouth daily.  POLYPODIUM LEUCOTOMOS EXTRACT (HELIOCARE PO) Take  by mouth daily.  ezetimibe (ZETIA) 10 mg tablet Take  by mouth.  OMEGA-3 FATTY ACIDS (OMEGA 3 PO) Take 2,400 mg by mouth two (2) times a day.  sertraline (ZOLOFT) 50 mg tablet Take  by mouth daily.     OMEPRAZOLE (PRILOSEC PO) Take 20 mg by mouth daily. No current facility-administered medications for this visit. The above histories, medications and allergies have been reviewed. Review of Systems   HENT: Positive for congestion and sinus pain. Cardiovascular: Positive for chest pain. Gastrointestinal: Positive for heartburn. Psychiatric/Behavioral: Positive for depression. Visit Vitals  /74 (BP 1 Location: Left arm, BP Patient Position: Sitting)   Pulse (!) 58   Temp 97.1 °F (36.2 °C) (Temporal)   Resp 14   Ht 5' 7\" (1.702 m)   Wt 220 lb (99.8 kg)   SpO2 100%   BMI 34.46 kg/m²     Physical Exam  Cardiovascular:      Rate and Rhythm: Normal rate and regular rhythm. Pulmonary:      Effort: Pulmonary effort is normal. No respiratory distress. Breath sounds: Normal breath sounds. Abdominal:      General: There is no distension. Palpations: Abdomen is soft. There is no mass. Tenderness: There is no abdominal tenderness. Neurological:      Mental Status: She is alert. 1. Colon cancer screening  Recommend colonoscopy. The procedure was explained in detail including the risks and benefits. Risks shared included risks of missed lesions, incomplete exam, colon injury or perforation. Risks associated with anesthesia were also discussed. The patient wishes to proceed and we will schedule. The patient was counseled at length about the risks of ana Covid-19 during their perioperative period and any recovery window from their procedure. The patient was made aware that ana Covid-19  may worsen their prognosis for recovering from their procedure and lend to a higher morbidity and/or mortality risk. All material risks, benefits, and reasonable alternatives including postponing the procedure were discussed. The patient does  wish to proceed with the procedure at this time. Follow-up and Dispositions    · Return for post procedure. Doc MD Bharat

## 2020-06-08 NOTE — PROGRESS NOTES
1. Have you been to the ER, urgent care clinic since your last visit? Hospitalized since your last visit? No    2. Have you seen or consulted any other health care providers outside of the [address] [place] [address] since your last visit? Include any pap smears or colon screening.  No

## 2020-07-08 PROBLEM — Z12.11 COLON CANCER SCREENING: Status: RESOLVED | Noted: 2020-06-08 | Resolved: 2020-07-08

## 2021-02-11 ENCOUNTER — TRANSCRIBE ORDER (OUTPATIENT)
Dept: SCHEDULING | Age: 66
End: 2021-02-11

## 2021-02-11 DIAGNOSIS — Z12.31 ENCOUNTER FOR SCREENING MAMMOGRAM FOR MALIGNANT NEOPLASM OF BREAST: ICD-10-CM

## 2021-02-11 DIAGNOSIS — Z78.0 ASYMPTOMATIC MENOPAUSAL STATE: Primary | ICD-10-CM

## 2021-04-23 ENCOUNTER — HOSPITAL ENCOUNTER (OUTPATIENT)
Dept: GENERAL RADIOLOGY | Age: 66
Discharge: HOME | End: 2021-04-23
Attending: INTERNAL MEDICINE
Payer: MEDICARE

## 2021-04-23 ENCOUNTER — HOSPITAL ENCOUNTER (OUTPATIENT)
Dept: MAMMOGRAPHY | Age: 66
Discharge: HOME | End: 2021-04-23
Attending: INTERNAL MEDICINE
Payer: MEDICARE

## 2021-04-23 DIAGNOSIS — Z12.31 ENCOUNTER FOR SCREENING MAMMOGRAM FOR MALIGNANT NEOPLASM OF BREAST: ICD-10-CM

## 2021-04-23 DIAGNOSIS — Z78.0 ASYMPTOMATIC MENOPAUSAL STATE: ICD-10-CM

## 2021-04-23 PROCEDURE — 77063 BREAST TOMOSYNTHESIS BI: CPT

## 2021-04-23 PROCEDURE — 77080 DXA BONE DENSITY AXIAL: CPT

## 2021-08-18 ENCOUNTER — HOSPITAL ENCOUNTER (OUTPATIENT)
Dept: GENERAL RADIOLOGY | Age: 66
Discharge: HOME | End: 2021-08-18
Payer: MEDICARE

## 2021-08-18 ENCOUNTER — TRANSCRIBE ORDER (OUTPATIENT)
Dept: REGISTRATION | Age: 66
End: 2021-08-18

## 2021-08-18 DIAGNOSIS — M25.512 LEFT SHOULDER PAIN: ICD-10-CM

## 2021-08-18 DIAGNOSIS — M25.512 LEFT SHOULDER PAIN: Primary | ICD-10-CM

## 2021-08-18 PROCEDURE — 73030 X-RAY EXAM OF SHOULDER: CPT

## 2022-02-08 ENCOUNTER — TRANSCRIBE ORDER (OUTPATIENT)
Dept: REGISTRATION | Age: 67
End: 2022-02-08

## 2022-02-08 ENCOUNTER — HOSPITAL ENCOUNTER (OUTPATIENT)
Dept: GENERAL RADIOLOGY | Age: 67
Discharge: HOME | End: 2022-02-08
Payer: MEDICARE

## 2022-02-08 DIAGNOSIS — M25.559 PAIN, HIP: ICD-10-CM

## 2022-02-08 DIAGNOSIS — M25.559 PAIN, HIP: Primary | ICD-10-CM

## 2022-02-08 PROCEDURE — 73502 X-RAY EXAM HIP UNI 2-3 VIEWS: CPT

## 2022-05-03 ENCOUNTER — TRANSCRIBE ORDER (OUTPATIENT)
Dept: REGISTRATION | Age: 67
End: 2022-05-03

## 2022-05-03 ENCOUNTER — HOSPITAL ENCOUNTER (OUTPATIENT)
Dept: GENERAL RADIOLOGY | Age: 67
Discharge: HOME | End: 2022-05-03
Payer: MEDICARE

## 2022-05-03 DIAGNOSIS — M79.605 LEFT LEG PAIN: ICD-10-CM

## 2022-05-03 DIAGNOSIS — M25.562 LEFT KNEE PAIN: ICD-10-CM

## 2022-05-03 DIAGNOSIS — M79.605 LEFT LEG PAIN: Primary | ICD-10-CM

## 2022-05-03 PROCEDURE — 73630 X-RAY EXAM OF FOOT: CPT

## 2022-05-03 PROCEDURE — 73564 X-RAY EXAM KNEE 4 OR MORE: CPT

## 2022-05-03 PROCEDURE — 73590 X-RAY EXAM OF LOWER LEG: CPT

## 2022-06-03 ENCOUNTER — TRANSCRIBE ORDER (OUTPATIENT)
Dept: SCHEDULING | Age: 67
End: 2022-06-03

## 2022-06-03 DIAGNOSIS — M17.12 DEGENERATIVE ARTHRITIS OF LEFT KNEE: ICD-10-CM

## 2022-06-03 DIAGNOSIS — M23.42 LOOSE BODY OF LEFT KNEE: Primary | ICD-10-CM

## 2022-06-08 ENCOUNTER — TRANSCRIBE ORDER (OUTPATIENT)
Dept: SCHEDULING | Age: 67
End: 2022-06-08

## 2022-06-08 DIAGNOSIS — Z12.31 SCREENING MAMMOGRAM FOR BREAST CANCER: Primary | ICD-10-CM

## 2022-06-14 ENCOUNTER — HOSPITAL ENCOUNTER (OUTPATIENT)
Dept: MAMMOGRAPHY | Age: 67
Discharge: HOME | End: 2022-06-14
Attending: INTERNAL MEDICINE
Payer: MEDICARE

## 2022-06-14 VITALS — WEIGHT: 202 LBS | BODY MASS INDEX: 31.64 KG/M2

## 2022-06-14 DIAGNOSIS — Z12.31 SCREENING MAMMOGRAM FOR BREAST CANCER: ICD-10-CM

## 2022-06-14 PROCEDURE — 77063 BREAST TOMOSYNTHESIS BI: CPT

## 2022-06-16 ENCOUNTER — HOSPITAL ENCOUNTER (OUTPATIENT)
Dept: MRI IMAGING | Age: 67
Discharge: HOME | End: 2022-06-16
Attending: ORTHOPAEDIC SURGERY
Payer: MEDICARE

## 2022-06-16 DIAGNOSIS — M17.12 DEGENERATIVE ARTHRITIS OF LEFT KNEE: ICD-10-CM

## 2022-06-16 DIAGNOSIS — M23.42 LOOSE BODY OF LEFT KNEE: ICD-10-CM

## 2022-06-16 PROCEDURE — 73721 MRI JNT OF LWR EXTRE W/O DYE: CPT

## 2022-07-26 ENCOUNTER — ANESTHESIA EVENT (OUTPATIENT)
Dept: SURGERY | Age: 67
End: 2022-07-26
Payer: MEDICARE

## 2022-07-26 NOTE — ANESTHESIA PREPROCEDURE EVALUATION
Relevant Problems   CARDIOVASCULAR   (+) Coronary atherosclerosis of native coronary artery   (+) Essential hypertension, benign       Anesthetic History   No history of anesthetic complications            Review of Systems / Medical History  Patient summary reviewed, nursing notes reviewed and pertinent labs reviewed    Pulmonary          Smoker (former)         Neuro/Psych   Within defined limits           Cardiovascular    Hypertension          CAD (stable), cardiac stents (2003) and hyperlipidemia         GI/Hepatic/Renal     GERD           Endo/Other        Obesity     Other Findings            Physical Exam    Airway  Mallampati: II  TM Distance: 4 - 6 cm  Neck ROM: normal range of motion   Mouth opening: Normal     Cardiovascular               Dental  No notable dental hx       Pulmonary                 Abdominal  GI exam deferred       Other Findings            Anesthetic Plan    ASA: 3  Anesthesia type: general          Induction: Intravenous  Anesthetic plan and risks discussed with: Patient

## 2022-08-01 ENCOUNTER — HOSPITAL ENCOUNTER (OUTPATIENT)
Dept: PREADMISSION TESTING | Age: 67
Discharge: HOME | End: 2022-08-01

## 2022-08-01 RX ORDER — DIPHENHYDRAMINE CITRATE AND IBUPROFEN 38; 200 MG/1; MG/1
TABLET, COATED ORAL
COMMUNITY

## 2022-08-01 RX ORDER — BETAMETHASONE DIPROPIONATE 0.5 MG/G
CREAM TOPICAL 2 TIMES DAILY
COMMUNITY
End: 2022-08-09

## 2022-08-01 NOTE — PERIOP NOTES
[address] [place] [address]  SURGICAL PRE-ADMISSION INSTRUCTIONS    ARRIVAL  You will be called the day before your surgery with your expected arrival time. Sign in at the  of the hospital.  You will be directed to the Surgical Waiting Room. Please arrive at your scheduled appointment time. You have been scheduled to arrive for your procedure one or two hours prior to the expected start time of your procedure. Every effort will be made to minimize your wait but please be aware that unforeseen circumstances may affect our schedule. EATING  DO NOT EAT OR DRINK ANYTHING AFTER MIDNIGHT ON THE EVENING BEFORE YOUR SURGERY OR ON THE DAY OF YOUR SURGERY except for your medications (as instructed) with a sip of water. Do not use gum, mints or lozenges on the morning of your surgery. Please do not smoke or chew tobacco before your surgery. MEDICATIONS   none    STOP THESE MEDICATIONS AT THE TIMES LISTED BELOW  Aspirin ;  7 days before                                                NSAIDS (Indocin, Ibuprofen, Naprosyn) ;  7 days before     DRIVING/TRANSPORATION  Have a responsible adult to drive you home from the hospital and to stay with you over night. Please have them plan to remain in the hospital during your surgery. Your surgery will not be done if you do not have a responsible adult to take you home and to stay with you. If you have arranged for public transport, you must have a responsible adult to ride with you (who is not the ). You may not drive for 24 hours after anesthesia. PREPARATION  If you have a Living WiIl/Advance Directive, please bring a copy with you to scan into your chart. Please DO NOT wear makeup or nail polish  Please leave valuables at home,  DO NOT wear jewelry. Wear loose, comfortable clothing that is large enough to cover a bulky dressing.     SPECIAL INSTRUCTIONS:  Shower with the Preoperative Skin Preparation CHLORHEXIDINE as instructed.     Reviewed above preoperative instructions and answered questions by phone interview    Patient:  Lanre Lopez   Date:     August 1, 2022  Time:   10:55 AM    RN:  Héctor Banks RN    Date:     August 1, 2022  Time:   10:55 AM

## 2022-08-08 PROBLEM — S83.282A ACUTE LATERAL MENISCUS TEAR OF LEFT KNEE: Status: ACTIVE | Noted: 2022-08-08

## 2022-08-08 NOTE — H&P
History and Physical    Patient: Rip Alcazar MRN: [id number]  SSN: [id number]    YOB: 1955  Age: 77 y.o. Sex: female      Subjective:      Rip Alcazar is a 77 y.o. female who  has had pain and catching in her left knee. This is been localized  along the anterior lateral aspect of her knee. MRI scan is consistent with a lateral meniscus tear. She has some mild degenerative changes in the knee. She has failed to respond to NSAIDs and steroid injection. She is here for arthroscopic treatment of lateral meniscus tear. .     Past Medical History:   Diagnosis Date    Coronary atherosclerosis of native coronary artery 3/22/2011    Essential hypertension, benign 3/22/2011    GERD (gastroesophageal reflux disease)     Menopause     age 46    Mixed hyperlipidemia 3/22/2011    Reflux esophagitis 3/22/2011     Past Surgical History:   Procedure Laterality Date    HX BREAST BIOPSY Left     2002    HX COLONOSCOPY  2009    normal    HX HEART CATHETERIZATION  2003    RCA >90% stenosis    HX PARTIAL HYSTERECTOMY      age 39    HX PTCA  2003    stent to RCA. Hepacoat stent    STRESS TEST MYOVIEW      no fixed or reversible deficits. Gated EF (%): 72.       Family History   Problem Relation Age of Onset    No Known Problems Mother      Social History     Tobacco Use    Smoking status: Former     Types: Cigarettes     Quit date: 1985     Years since quittin.1    Smokeless tobacco: Never   Substance Use Topics    Alcohol use: No      Prior to Admission medications    Medication Sig Start Date End Date Taking? Authorizing Provider   Ibuprofen-diphenhydrAMINE (Advil PM) 200-38 mg tab     Provider, Historical   augmented betamethasone dipropionate (DIPROLENE-AF) 0.05 % topical cream Apply  to affected area two (2) times a day. Provider, Historical   losartan (COZAAR) 25 mg tablet Take 1 Tab by mouth daily.  19   Arlette Pineda MD   acyclovir (ZOVIRAX) 400 mg tablet Take 400 mg by mouth two (2) times a day. Provider, Historical   aspirin delayed-release 81 mg tablet Take  by mouth daily. Provider, Historical   CALCIUM CARBONATE/VITAMIN D3 (CALCIUM 600 + D,3, PO) Take  by mouth. Provider, Historical   cholecalciferol (VITAMIN D3) 25 mcg (1,000 unit) cap Take 2,000 Units by mouth daily. Provider, Historical   multivitamin (ONE A DAY) tablet Take 1 Tab by mouth daily. Provider, Historical   atorvastatin (LIPITOR) 40 mg tablet Take 60 mg by mouth daily. Provider, Historical   POLYPODIUM LEUCOTOMOS EXTRACT (HELIOCARE PO) Take  by mouth daily. Provider, Historical   ezetimibe (ZETIA) 10 mg tablet Take  by mouth. Provider, Historical   OMEGA-3 FATTY ACIDS (OMEGA 3 PO) Take 2,400 mg by mouth two (2) times a day. Provider, Historical   sertraline (ZOLOFT) 50 mg tablet Take  by mouth daily. Provider, Historical   OMEPRAZOLE (PRILOSEC PO) Take 20 mg by mouth daily. Provider, Historical        No Known Allergies    Review of Systems:  A comprehensive review of systems was negative. Objective: There were no vitals filed for this visit. Physical Exam:  GENERAL: alert, cooperative, no distress, appears stated age  LUNG: clear to auscultation bilaterally  HEART: regular rate and rhythm, S1, S2 normal, no murmur, click, rub or gallop  ABDOMEN: soft, non-tender. Bowel sounds normal. No masses,  no organomegaly  EXTREMITIES:  extremities normal, atraumatic, no cyanosis or edema, tender lateral joint line. SKIN: Normal.  NEUROLOGIC: negative    Assessment:     Hospital Problems  Date Reviewed: 8/8/2022            Codes Class Noted POA    * (Principal) Acute lateral meniscus tear of left knee ICD-10-CM: B97.785O  ICD-9-CM: 836.1  8/8/2022 Yes           Plan:       Arthroscopy and partial lateral meniscectomy left knee. See office note for more detailed discussion of risks benefits and options.     Signed By: Analisa Naranjo MD     August 8, 2022

## 2022-08-09 ENCOUNTER — ANESTHESIA (OUTPATIENT)
Dept: SURGERY | Age: 67
End: 2022-08-09
Payer: MEDICARE

## 2022-08-09 ENCOUNTER — HOSPITAL ENCOUNTER (OUTPATIENT)
Age: 67
Setting detail: OUTPATIENT SURGERY
Discharge: HOME | End: 2022-08-09
Attending: ORTHOPAEDIC SURGERY | Admitting: ORTHOPAEDIC SURGERY
Payer: MEDICARE

## 2022-08-09 VITALS
HEIGHT: 67 IN | HEART RATE: 72 BPM | TEMPERATURE: 98 F | BODY MASS INDEX: 33.27 KG/M2 | OXYGEN SATURATION: 97 % | DIASTOLIC BLOOD PRESSURE: 94 MMHG | RESPIRATION RATE: 11 BRPM | WEIGHT: 212 LBS | SYSTOLIC BLOOD PRESSURE: 162 MMHG

## 2022-08-09 DIAGNOSIS — M23.301 LATERAL MENISCUS DERANGEMENT, LEFT: Primary | ICD-10-CM

## 2022-08-09 PROBLEM — M23.319 MEDIAL MENISCUS, ANTERIOR HORN DERANGEMENT: Status: ACTIVE | Noted: 2022-08-09

## 2022-08-09 PROBLEM — S83.282A ACUTE LATERAL MENISCUS TEAR OF LEFT KNEE: Status: RESOLVED | Noted: 2022-08-08 | Resolved: 2022-08-09

## 2022-08-09 PROBLEM — M23.319 MEDIAL MENISCUS, ANTERIOR HORN DERANGEMENT: Status: RESOLVED | Noted: 2022-08-09 | Resolved: 2022-08-09

## 2022-08-09 PROCEDURE — 74011250636 HC RX REV CODE- 250/636: Performed by: ANESTHESIOLOGY

## 2022-08-09 PROCEDURE — 74011000250 HC RX REV CODE- 250: Performed by: ORTHOPAEDIC SURGERY

## 2022-08-09 PROCEDURE — 88305 TISSUE EXAM BY PATHOLOGIST: CPT

## 2022-08-09 PROCEDURE — 77030022036 HC BLD SHV TOMCAT STRY -B: Performed by: ORTHOPAEDIC SURGERY

## 2022-08-09 PROCEDURE — 74011000250 HC RX REV CODE- 250: Performed by: ANESTHESIOLOGY

## 2022-08-09 PROCEDURE — 74011250637 HC RX REV CODE- 250/637: Performed by: ANESTHESIOLOGY

## 2022-08-09 PROCEDURE — 76210000063 HC OR PH I REC FIRST 0.5 HR: Performed by: ORTHOPAEDIC SURGERY

## 2022-08-09 PROCEDURE — 77030008574 HC TBNG SUC IRR STRY -B: Performed by: ORTHOPAEDIC SURGERY

## 2022-08-09 PROCEDURE — 77030028906 HC WRP KNEE W/GEL BGS SOLM -B: Performed by: ORTHOPAEDIC SURGERY

## 2022-08-09 PROCEDURE — 76060000033 HC ANESTHESIA 1 TO 1.5 HR: Performed by: ORTHOPAEDIC SURGERY

## 2022-08-09 PROCEDURE — 77030000032 HC CUF TRNQT ZIMM -B: Performed by: ORTHOPAEDIC SURGERY

## 2022-08-09 PROCEDURE — 76010000149 HC OR TIME 1 TO 1.5 HR: Performed by: ORTHOPAEDIC SURGERY

## 2022-08-09 PROCEDURE — 77030002916 HC SUT ETHLN J&J -A: Performed by: ORTHOPAEDIC SURGERY

## 2022-08-09 PROCEDURE — 2709999900 HC NON-CHARGEABLE SUPPLY: Performed by: ORTHOPAEDIC SURGERY

## 2022-08-09 PROCEDURE — 74011000258 HC RX REV CODE- 258: Performed by: ORTHOPAEDIC SURGERY

## 2022-08-09 PROCEDURE — 74011250636 HC RX REV CODE- 250/636: Performed by: ORTHOPAEDIC SURGERY

## 2022-08-09 PROCEDURE — 77030040922 HC BLNKT HYPOTHRM STRY -A: Performed by: ORTHOPAEDIC SURGERY

## 2022-08-09 PROCEDURE — 77030018834: Performed by: ORTHOPAEDIC SURGERY

## 2022-08-09 RX ORDER — DIPHENHYDRAMINE HYDROCHLORIDE 50 MG/ML
12.5 INJECTION, SOLUTION INTRAMUSCULAR; INTRAVENOUS
Status: DISCONTINUED | OUTPATIENT
Start: 2022-08-09 | End: 2022-08-09 | Stop reason: HOSPADM

## 2022-08-09 RX ORDER — SODIUM CHLORIDE 0.9 % (FLUSH) 0.9 %
5-40 SYRINGE (ML) INJECTION EVERY 8 HOURS
Status: DISCONTINUED | OUTPATIENT
Start: 2022-08-09 | End: 2022-08-09 | Stop reason: HOSPADM

## 2022-08-09 RX ORDER — PROCHLORPERAZINE EDISYLATE 5 MG/ML
5 INJECTION INTRAMUSCULAR; INTRAVENOUS
Status: DISCONTINUED | OUTPATIENT
Start: 2022-08-09 | End: 2022-08-09 | Stop reason: HOSPADM

## 2022-08-09 RX ORDER — HYDROMORPHONE HYDROCHLORIDE 2 MG/ML
0.5 INJECTION, SOLUTION INTRAMUSCULAR; INTRAVENOUS; SUBCUTANEOUS
Status: DISCONTINUED | OUTPATIENT
Start: 2022-08-09 | End: 2022-08-09 | Stop reason: HOSPADM

## 2022-08-09 RX ORDER — ONDANSETRON 2 MG/ML
INJECTION INTRAMUSCULAR; INTRAVENOUS AS NEEDED
Status: DISCONTINUED | OUTPATIENT
Start: 2022-08-09 | End: 2022-08-09 | Stop reason: HOSPADM

## 2022-08-09 RX ORDER — SODIUM CHLORIDE, SODIUM LACTATE, POTASSIUM CHLORIDE, CALCIUM CHLORIDE 600; 310; 30; 20 MG/100ML; MG/100ML; MG/100ML; MG/100ML
150 INJECTION, SOLUTION INTRAVENOUS CONTINUOUS
Status: DISCONTINUED | OUTPATIENT
Start: 2022-08-09 | End: 2022-08-09 | Stop reason: HOSPADM

## 2022-08-09 RX ORDER — EPHEDRINE SULFATE/0.9% NACL/PF 50 MG/5 ML
5 SYRINGE (ML) INTRAVENOUS
Status: DISCONTINUED | OUTPATIENT
Start: 2022-08-09 | End: 2022-08-09 | Stop reason: HOSPADM

## 2022-08-09 RX ORDER — LIDOCAINE HYDROCHLORIDE 20 MG/ML
INJECTION, SOLUTION EPIDURAL; INFILTRATION; INTRACAUDAL; PERINEURAL AS NEEDED
Status: DISCONTINUED | OUTPATIENT
Start: 2022-08-09 | End: 2022-08-09 | Stop reason: HOSPADM

## 2022-08-09 RX ORDER — BUPIVACAINE HYDROCHLORIDE 5 MG/ML
20 INJECTION, SOLUTION EPIDURAL; INTRACAUDAL ONCE
Status: COMPLETED | OUTPATIENT
Start: 2022-08-09 | End: 2022-08-09

## 2022-08-09 RX ORDER — FENTANYL CITRATE 50 UG/ML
50 INJECTION, SOLUTION INTRAMUSCULAR; INTRAVENOUS AS NEEDED
Status: DISCONTINUED | OUTPATIENT
Start: 2022-08-09 | End: 2022-08-09 | Stop reason: HOSPADM

## 2022-08-09 RX ORDER — PROPOFOL 10 MG/ML
INJECTION, EMULSION INTRAVENOUS AS NEEDED
Status: DISCONTINUED | OUTPATIENT
Start: 2022-08-09 | End: 2022-08-09 | Stop reason: HOSPADM

## 2022-08-09 RX ORDER — DEXAMETHASONE SODIUM PHOSPHATE 4 MG/ML
INJECTION, SOLUTION INTRA-ARTICULAR; INTRALESIONAL; INTRAMUSCULAR; INTRAVENOUS; SOFT TISSUE AS NEEDED
Status: DISCONTINUED | OUTPATIENT
Start: 2022-08-09 | End: 2022-08-09 | Stop reason: HOSPADM

## 2022-08-09 RX ORDER — SODIUM CHLORIDE, SODIUM LACTATE, POTASSIUM CHLORIDE, CALCIUM CHLORIDE 600; 310; 30; 20 MG/100ML; MG/100ML; MG/100ML; MG/100ML
125 INJECTION, SOLUTION INTRAVENOUS CONTINUOUS
Status: DISCONTINUED | OUTPATIENT
Start: 2022-08-09 | End: 2022-08-09 | Stop reason: HOSPADM

## 2022-08-09 RX ORDER — ACETAMINOPHEN 500 MG
1000 TABLET ORAL ONCE
Status: COMPLETED | OUTPATIENT
Start: 2022-08-09 | End: 2022-08-09

## 2022-08-09 RX ORDER — SODIUM CHLORIDE 0.9 % (FLUSH) 0.9 %
5-40 SYRINGE (ML) INJECTION AS NEEDED
Status: DISCONTINUED | OUTPATIENT
Start: 2022-08-09 | End: 2022-08-09 | Stop reason: HOSPADM

## 2022-08-09 RX ORDER — FENTANYL CITRATE 50 UG/ML
INJECTION, SOLUTION INTRAMUSCULAR; INTRAVENOUS AS NEEDED
Status: DISCONTINUED | OUTPATIENT
Start: 2022-08-09 | End: 2022-08-09 | Stop reason: HOSPADM

## 2022-08-09 RX ORDER — GLYCOPYRROLATE 0.2 MG/ML
INJECTION INTRAMUSCULAR; INTRAVENOUS AS NEEDED
Status: DISCONTINUED | OUTPATIENT
Start: 2022-08-09 | End: 2022-08-09 | Stop reason: HOSPADM

## 2022-08-09 RX ORDER — BUPIVACAINE HYDROCHLORIDE 5 MG/ML
INJECTION, SOLUTION EPIDURAL; INTRACAUDAL
Status: DISCONTINUED
Start: 2022-08-09 | End: 2022-08-09 | Stop reason: HOSPADM

## 2022-08-09 RX ORDER — MIDAZOLAM HYDROCHLORIDE 1 MG/ML
INJECTION, SOLUTION INTRAMUSCULAR; INTRAVENOUS AS NEEDED
Status: DISCONTINUED | OUTPATIENT
Start: 2022-08-09 | End: 2022-08-09 | Stop reason: HOSPADM

## 2022-08-09 RX ORDER — HYDROCODONE BITARTRATE AND ACETAMINOPHEN 5; 325 MG/1; MG/1
1 TABLET ORAL
Qty: 10 TABLET | Refills: 0 | Status: SHIPPED | OUTPATIENT
Start: 2022-08-09 | End: 2022-08-12

## 2022-08-09 RX ORDER — LABETALOL HCL 20 MG/4 ML
10 SYRINGE (ML) INTRAVENOUS
Status: DISCONTINUED | OUTPATIENT
Start: 2022-08-09 | End: 2022-08-09 | Stop reason: HOSPADM

## 2022-08-09 RX ADMIN — PROPOFOL 150 MG: 10 INJECTION, EMULSION INTRAVENOUS at 07:56

## 2022-08-09 RX ADMIN — MIDAZOLAM 2 MG: 1 INJECTION INTRAMUSCULAR; INTRAVENOUS at 07:52

## 2022-08-09 RX ADMIN — FENTANYL CITRATE 50 MCG: 50 INJECTION, SOLUTION INTRAMUSCULAR; INTRAVENOUS at 08:42

## 2022-08-09 RX ADMIN — ACETAMINOPHEN 1000 MG: 500 TABLET ORAL at 07:38

## 2022-08-09 RX ADMIN — SODIUM CHLORIDE, POTASSIUM CHLORIDE, SODIUM LACTATE AND CALCIUM CHLORIDE 150 ML/HR: 600; 310; 30; 20 INJECTION, SOLUTION INTRAVENOUS at 07:43

## 2022-08-09 RX ADMIN — LIDOCAINE HYDROCHLORIDE 60 MG: 20 INJECTION, SOLUTION EPIDURAL; INFILTRATION; INTRACAUDAL; PERINEURAL at 07:56

## 2022-08-09 RX ADMIN — CEFAZOLIN 2 G: 2 INJECTION, POWDER, FOR SOLUTION INTRAMUSCULAR; INTRAVENOUS at 07:40

## 2022-08-09 RX ADMIN — ONDANSETRON 4 MG: 2 INJECTION INTRAMUSCULAR; INTRAVENOUS at 08:07

## 2022-08-09 RX ADMIN — DEXAMETHASONE SODIUM PHOSPHATE 4 MG: 4 INJECTION, SOLUTION INTRAMUSCULAR; INTRAVENOUS at 08:07

## 2022-08-09 RX ADMIN — FENTANYL CITRATE 50 MCG: 50 INJECTION, SOLUTION INTRAMUSCULAR; INTRAVENOUS at 08:07

## 2022-08-09 RX ADMIN — GLYCOPYRROLATE 0.2 MG: 0.2 INJECTION, SOLUTION INTRAMUSCULAR; INTRAVENOUS at 07:52

## 2022-08-09 NOTE — BRIEF OP NOTE
Brief Postoperative Note    Patient: Vivien Prakash  YOB: 1955  MRN: [id number]    Date of Procedure: 8/9/2022     Pre-Op Diagnosis: LEFT KNEE TEAR OF ANTERIOR OF LATERAL MENSCUS    Post-Op Diagnosis:  Lateral and medial meniscus tears with Grade 3 chondrosis medial and latera femoral condyles and Grade 4 lateral patella facet. Procedure(s):  LEFT KNEE ARTHROSCOPY AND MENISCECTOMY    Surgeon(s):  Berenice Oden MD    Surgical Assistant: None    Anesthesia: General     Estimated Blood Loss (mL): Minimal    Complications: None    Specimens:   ID Type Source Tests Collected by Time Destination   1 : left knee shavings Preservative Knee, left  [facility] [place] 8/9/2022 0900 Pathology        Implants: * No implants in log *    Drains: * No LDAs found *    Findings: Horizontal tear of posterior horn lat meniscus and large tear of AH and body lat meniscus. Chondrosis.     Electronically Signed by Kj Puente MD on 8/9/2022 at 9:05 AM

## 2022-08-09 NOTE — OP NOTES
[facility]  OPERATIVE REPORT    Name:  Irene Reveles  MR#:  [id number]  :  1955  ACCOUNT #:  [de-identified]  DATE OF SERVICE:  2022    PREOPERATIVE DIAGNOSES:  Tears of the anterior horn lateral meniscus and possible tear of the posterior horn medial meniscus. POSTOPERATIVE DIAGNOSES:  Tears of the anterior horn lateral meniscus and tear of the posterior horn medial meniscus (horizontal tear) along with grade III chondrosis with loose articular flaps over the medial and lateral femoral weightbearing surfaces and grade IV chondrosis over the lateral facet of the patella. PROCEDURE PERFORMED:  Arthroscopy with partial medial and lateral meniscectomy. SURGEON:  Analisa Naranjo MD    ASSISTANT:  None. ANESTHESIA:  General LMA, Dr. Reyes Nearing. COMPLICATIONS:  None. SPECIMENS REMOVED:  Knee shavings. IMPLANTS:  None. ESTIMATED BLOOD LOSS:  Minimal under tourniquet control. TOURNIQUET TIME:  32 minutes. PROCEDURE IN DETAIL:  The patient was evaluated in the outpatient surgery area. The left knee was marked as surgical site and verified with the patient. Her MRI scan and x-ray were evaluated. She had minimal degenerative changes in the knee by x-ray. MRI scan demonstrating tears of the anterior horn lateral meniscus as well as possible tear of the posterior horn medial meniscus with significant signal intensity in the posterior horn. In addition, there was chondrosis noted through the patella. She was taken to the operating room, placed supine on the operating table, and placed under general LMA anesthesia. The left leg was placed in a tourniquet leg holding device, prepped and draped in the usual sterile fashion. Time-out was called verifying the patient's site of surgery and surgical procedure. 2 g of Ancef were given preoperatively. The extremity was exsanguinated and the tourniquet inflated to 300 mmHg.   Scope introduced through an anterior lateral portal.  Anteromedial portal was used for instrumentation. Visualization in the patellofemoral joint revealed that there was some degenerative fraying off the undersurface of the patella. The trochlear groove and the anterior trochlea appeared to be within normal limits with good articular cartilage. There appeared to be an area of grade IV chondrosis over the lateral facet of the patella in an isolated area about 1 x 1 cm in size. Moving into the medial gutter, this was clear. Moving into the medial compartment, there was evidence of fraying over the posterior horn medial meniscus. Additional probing demonstrated there was a horizontal tearing into the medial meniscus. This was debrided with a combination of duckbill forceps and motorized resector. The grade III articular cartilage flaps over the medial femoral condyle were debrided also with the motorized resector. There was no full-thickness cartilage loss noted. The tibial surface appeared to have intact articular cartilage. Moving into the intercondylar notch, the ACL was intact and appeared stable. No loose bodies were noted in the intercondylar region. Moving into the lateral compartment, there was immediately noted a large tear extending through the anterior horn and body, extending up to the posterior horn of the lateral meniscus which was intact to probing. This was a longitudinal tear. This was debrided with a combination of duckbill forceps, side biters, and backbiters along with motorized resector. This gave a stable contour to the anterior horn and body of the meniscus. Again, the posterior horn was probed and noted to be stable throughout. There was a large articular cartilage flap on the lateral femoral condyle, which was debrided with the motorized resector back to stable edges. This was not full-thickness and appeared to be grade III. Moving into the lateral gutter, the popliteus hiatus was free of loose bodies.   Moving back to the patellofemoral joint, the articular cartilage was assessed. Limited resection of the frayed surface of cartilage over the patella was carried out. No loose bodies were noted in the suprapatellar pouch. Again, a grade IV articular cartilage lesion over the lateral facet of the patella was noted, but otherwise, good cartilage within the trochlear groove. At this point, the joint was irrigated. Scope and instruments were removed. The subcutaneous tissue around the incisions was infiltrated with 20 mL of 0.5% Marcaine. The incisions were closed with 3-0 nylon sutures. Sterile dressing was applied. Tourniquet released for a tourniquet time of 32 minutes. The patient returned to the recovery room in satisfactory condition.       Isela Howe MD      [id number]  D:  08/09/2022 9:27  T:  08/09/2022 9:59  JOB #:  [id number]  CC:  Bar Aparicio MD

## 2022-08-09 NOTE — DISCHARGE INSTRUCTIONS
Vinicius Gaxiola MD    Patient Name  Rip Alcazar  Date of procedure  8/9/2022    Procedure  Procedure(s):  LEFT KNEE ARTHROSCOPY AND MENISCECTOMY  Surgeon  Surgeon(s) and Role:     * Shannan Linton MD - Primary  Date of discharge: [unfilled]  PCP: @PCP@    POST-OPERATIVE INSTRUCTIONS  Knee Arthroscopy    First meal at home should be clear liquids. Progress to regular diet as tolerated. Apply an ice bag or use cold therapy device for 20-30 minutes 4 times a day for the first 48-72 hours to reduce swelling and pain and then use as desired. You may remove the bulky dressing 48 hours after surgery and at that time it is ok to shower. Apply band-aids over the small incisions and change daily after showers. Elevate your surgical leg when sitting or sleeping to reduce swelling. Do not place a pillow directly under the knee, place it under your ankle. Exercises - practice quadricep tightening, straight leg raising exercises, and ankle pumps several times a day. Gradually work on bending your knee as tolerated. Crutches will be provided if you do not already have them. Use crutches  If necessary for balance. You may fully weight bear with 2 crutches and progress off of them as you tolerate. Let pain be your guide, too much pain, too much activity. A prescription for pain medication will be provided for you on the day of your surgery. Please take one aspirin a day for 7 days beginning the day after your surgery. If you develop a fever greater than 102, unexpected redness or swelling in your arm, please take Tylenol and call the office. Some bleeding and or drainage can be expected the first few days after surgery. A post-op appointment has been scheduled for you. Please call the office if you need to re-schedule your appointment for another day or time ([phone]). Thank you for following the above instructions.   If you have any questions, please call my office and the  will put you in touch with one of my assistants ([phone]). ______________________________________________________________________    Anesthesia Discharge Instructions    After general anesthesia or intervenous sedation, for 24 hours or while taking prescription Narcotics:  Limit your activities  Do not drive or operate hazardous machinery  If you have not urinated within 8 hours after discharge, please contact your surgeon on call. Do not make important personal or business decisions  Do not drink alcoholic beverages    Report the following to your surgeon:  Excessive pain, swelling, redness or odor of or around the surgical area  Temperature over 100.5 degrees  Nausea and vomiting lasting longer than 4 hours or if unable to take medication  Any signs of decreased circulation or nerve impairment to extremity:  Change in color, persistent numbness, tingling, coldness or increased pain. Any questions            Ronnie Gomez MD      After [address]:  Discharge Instructions Knee arthroscopy-  Patient Name:Carolina Coleman  Date of procedure:8/9/2022    Procedure:Procedure(s):  LEFT KNEE ARTHROSCOPY AND MENISCECTOMY  Surgeon:Surgeon(s) and Role:     * Octavio Mancilla MD - Primary   PCP: @PCP@  Date of discharge: [unfilled]      Follow up appointments  -follow up with Dr. Joya Hernandez in 1 week. Call [phone]  to make an appointment. Use ice every 2-3 hours to decrease swelling. Take aspirin 81 mg once daily to help prevent blood clots. Take Ibuprofen 400 mg. 4 times a day as needed to decrease pain and inflammation. Take oxycodone / hydrocodone 5 mg only as needed every 4-6 hours for pain. These medications have Tylenol in them and you should avoid taking additional Tylenol within 4 hr of taking these medications.   You may take Tylenol  in recommended dosages to supplement the ibuprofen or any other anti inflammatory medications for additional pain relief. Use crutches for balance however you may bear weight as tolerated in your operative leg. There are no restrictions as far as how much weight you may bear on your operative leg. You may remove dressing and shower 48 hours from surgery. You may shower at that time but do not soak the incisions under water. You may leave the incisions open or cover with Band-Aids to avoid contact with clothing. Try to keep active and stay moving throughout the day and advance your activity as you can tolerate on the operative knee. This helps avoid postoperative stiffness and decreases the chance of developing a blood clot in the leg.

## 2022-08-09 NOTE — ANESTHESIA POSTPROCEDURE EVALUATION
Post-Anesthesia Evaluation and Assessment    Cardiovascular Function/Vital Signs  Visit Vitals  BP (!) 144/86   Pulse 84   Temp 36.7 °C (98 °F)   Resp 14   Ht 5' 7\" (1.702 m)   Wt 96.2 kg (212 lb)   SpO2 96%   BMI 33.20 kg/m²       Patient is status post Procedure(s):  LEFT KNEE ARTHROSCOPY AND MENISCECTOMY. Nausea/Vomiting: Controlled. Postoperative hydration reviewed and adequate. Pain:  Pain Scale 1: Numeric (0 - 10) (08/09/22 0900)  Pain Intensity 1: 0 (08/09/22 0900)   Managed. Neurological Status:   Neuro (WDL): Within Defined Limits (08/09/22 0900)   At baseline. Mental Status and Level of Consciousness: Arousable. Pulmonary Status:   O2 Device: Nasal cannula (08/09/22 0904)   Adequate oxygenation and airway patent. Complications related to anesthesia: None    Post-anesthesia assessment completed. No concerns. Patient has met all discharge requirements. Signed By: Phoebe Smiley MD    August 9, 2022               Procedure(s):  LEFT KNEE ARTHROSCOPY AND MENISCECTOMY. general    <BSHSIANPOST>    INITIAL Post-op Vital signs:   Vitals Value Taken Time   /85 08/09/22 0905   Temp 36.7 °C (98 °F) 08/09/22 0904   Pulse 80 08/09/22 0906   Resp 16 08/09/22 0906   SpO2 97 % 08/09/22 0906   Vitals shown include unvalidated device data.

## 2023-01-17 ENCOUNTER — HOSPITAL ENCOUNTER (OUTPATIENT)
Dept: GENERAL RADIOLOGY | Age: 68
Discharge: HOME | End: 2023-01-17
Payer: MEDICARE

## 2023-01-17 ENCOUNTER — TRANSCRIBE ORDER (OUTPATIENT)
Dept: REGISTRATION | Age: 68
End: 2023-01-17

## 2023-01-17 DIAGNOSIS — M25.562 LEFT KNEE PAIN: Primary | ICD-10-CM

## 2023-01-17 DIAGNOSIS — M25.562 LEFT KNEE PAIN: ICD-10-CM

## 2023-01-17 PROCEDURE — 73564 X-RAY EXAM KNEE 4 OR MORE: CPT

## 2023-03-08 ENCOUNTER — TRANSCRIBE ORDER (OUTPATIENT)
Dept: REGISTRATION | Age: 68
End: 2023-03-08

## 2023-03-08 ENCOUNTER — HOSPITAL ENCOUNTER (OUTPATIENT)
Dept: GENERAL RADIOLOGY | Age: 68
Discharge: HOME | End: 2023-03-08
Payer: MEDICARE

## 2023-03-08 ENCOUNTER — HOSPITAL ENCOUNTER (OUTPATIENT)
Dept: NON INVASIVE DIAGNOSTICS | Age: 68
Discharge: HOME | End: 2023-03-08
Payer: MEDICARE

## 2023-03-08 ENCOUNTER — HOSPITAL ENCOUNTER (OUTPATIENT)
Dept: LAB | Age: 68
Discharge: HOME | End: 2023-03-08
Payer: MEDICARE

## 2023-03-08 DIAGNOSIS — Z01.818 OTHER SPECIFIED PRE-OPERATIVE EXAMINATION: Primary | ICD-10-CM

## 2023-03-08 DIAGNOSIS — M17.12 DEGENERATIVE ARTHRITIS OF LEFT KNEE: ICD-10-CM

## 2023-03-08 DIAGNOSIS — Z01.818 OTHER SPECIFIED PRE-OPERATIVE EXAMINATION: ICD-10-CM

## 2023-03-08 LAB
ALBUMIN SERPL-MCNC: 3.9 G/DL (ref 3.5–5)
ALBUMIN/GLOB SERPL: 1.1 (ref 1.1–2.2)
ALP SERPL-CCNC: 90 U/L (ref 45–117)
ALT SERPL-CCNC: 28 U/L (ref 12–78)
AMORPH CRY URNS QL MICRO: ABNORMAL
ANION GAP SERPL CALC-SCNC: 10 MMOL/L (ref 5–15)
APPEARANCE UR: CLEAR
AST SERPL-CCNC: 18 U/L (ref 15–37)
ATRIAL RATE: 67 BPM
BACTERIA URNS QL MICRO: NEGATIVE /HPF
BASOPHILS # BLD: 0.1 K/UL (ref 0–0.1)
BASOPHILS NFR BLD: 1 % (ref 0–1)
BILIRUB SERPL-MCNC: 0.4 MG/DL (ref 0.2–1)
BILIRUB UR QL: NEGATIVE
BUN SERPL-MCNC: 18 MG/DL (ref 6–20)
BUN/CREAT SERPL: 20 (ref 12–20)
CALCIUM SERPL-MCNC: 8.9 MG/DL (ref 8.5–10.1)
CALCULATED P AXIS, ECG09: 43 DEGREES
CALCULATED R AXIS, ECG10: 26 DEGREES
CALCULATED T AXIS, ECG11: 36 DEGREES
CHLORIDE SERPL-SCNC: 104 MMOL/L (ref 97–108)
CO2 SERPL-SCNC: 28 MMOL/L (ref 21–32)
COLOR UR: ABNORMAL
CREAT SERPL-MCNC: 0.9 MG/DL (ref 0.55–1.02)
DIAGNOSIS, 93000: NORMAL
DIFFERENTIAL METHOD BLD: NORMAL
EOSINOPHIL # BLD: 0.3 K/UL (ref 0–0.4)
EOSINOPHIL NFR BLD: 4 % (ref 0–7)
EPITH CASTS URNS QL MICRO: ABNORMAL /LPF
ERYTHROCYTE [DISTWIDTH] IN BLOOD BY AUTOMATED COUNT: 12.7 % (ref 11.5–14.5)
GLOBULIN SER CALC-MCNC: 3.6 G/DL (ref 2–4)
GLUCOSE SERPL-MCNC: 123 MG/DL (ref 65–100)
GLUCOSE UR STRIP.AUTO-MCNC: NEGATIVE MG/DL
HCT VFR BLD AUTO: 44.2 % (ref 35–47)
HGB BLD-MCNC: 14.4 G/DL (ref 11.5–16)
HGB UR QL STRIP: ABNORMAL
IMM GRANULOCYTES # BLD AUTO: 0 K/UL (ref 0–0.04)
IMM GRANULOCYTES NFR BLD AUTO: 0 % (ref 0–0.5)
KETONES UR QL STRIP.AUTO: NEGATIVE MG/DL
LEUKOCYTE ESTERASE UR QL STRIP.AUTO: NEGATIVE
LYMPHOCYTES # BLD: 2.6 K/UL (ref 0.8–3.5)
LYMPHOCYTES NFR BLD: 36 % (ref 12–49)
MCH RBC QN AUTO: 28.9 PG (ref 26–34)
MCHC RBC AUTO-ENTMCNC: 32.6 G/DL (ref 30–36.5)
MCV RBC AUTO: 88.8 FL (ref 80–99)
MONOCYTES # BLD: 0.6 K/UL (ref 0–1)
MONOCYTES NFR BLD: 8 % (ref 5–13)
NEUTS SEG # BLD: 3.7 K/UL (ref 1.8–8)
NEUTS SEG NFR BLD: 51 % (ref 32–75)
NITRITE UR QL STRIP.AUTO: NEGATIVE
NRBC # BLD: 0 K/UL (ref 0–0.01)
NRBC BLD-RTO: 0 PER 100 WBC
P-R INTERVAL, ECG05: 146 MS
PH UR STRIP: 6 (ref 5–8)
PLATELET # BLD AUTO: 276 K/UL (ref 150–400)
PMV BLD AUTO: 10.2 FL (ref 8.9–12.9)
POTASSIUM SERPL-SCNC: 3.9 MMOL/L (ref 3.5–5.1)
PROT SERPL-MCNC: 7.5 G/DL (ref 6.4–8.2)
PROT UR STRIP-MCNC: ABNORMAL MG/DL
Q-T INTERVAL, ECG07: 398 MS
QRS DURATION, ECG06: 84 MS
QTC CALCULATION (BEZET), ECG08: 420 MS
RBC # BLD AUTO: 4.98 M/UL (ref 3.8–5.2)
RBC #/AREA URNS HPF: ABNORMAL /HPF (ref 0–5)
SODIUM SERPL-SCNC: 142 MMOL/L (ref 136–145)
SP GR UR REFRACTOMETRY: 1.02 (ref 1–1.03)
UROBILINOGEN UR QL STRIP.AUTO: 0.2 EU/DL (ref 0.2–1)
VENTRICULAR RATE, ECG03: 67 BPM
WBC # BLD AUTO: 7.3 K/UL (ref 3.6–11)
WBC URNS QL MICRO: ABNORMAL /HPF (ref 0–4)

## 2023-03-08 PROCEDURE — 71046 X-RAY EXAM CHEST 2 VIEWS: CPT

## 2023-03-08 PROCEDURE — 93005 ELECTROCARDIOGRAM TRACING: CPT

## 2023-03-08 PROCEDURE — 81001 URINALYSIS AUTO W/SCOPE: CPT

## 2023-03-08 PROCEDURE — 36415 COLL VENOUS BLD VENIPUNCTURE: CPT

## 2023-03-08 PROCEDURE — 85025 COMPLETE CBC W/AUTO DIFF WBC: CPT

## 2023-03-08 PROCEDURE — 80053 COMPREHEN METABOLIC PANEL: CPT

## 2023-03-09 LAB
BACTERIA SPEC CULT: NORMAL
BACTERIA SPEC CULT: NORMAL
SERVICE CMNT-IMP: NORMAL

## 2023-03-27 ENCOUNTER — HOSPITAL ENCOUNTER (OUTPATIENT)
Dept: PREADMISSION TESTING | Age: 68
Discharge: HOME | End: 2023-03-27

## 2023-03-27 RX ORDER — MELATONIN 5 MG
5 CAPSULE ORAL
COMMUNITY

## 2023-03-27 NOTE — PERIOP NOTES
[address] [place] [address]  SURGICAL PRE-ADMISSION INSTRUCTIONS    ARRIVAL  You will be called the day before your surgery with your expected arrival time. Sign in at the  of the hospital.  You will be directed to the Surgical Waiting Room. Please arrive at your scheduled appointment time. You have been scheduled to arrive for your procedure one or two hours prior to the expected start time of your procedure. Every effort will be made to minimize your wait but please be aware that unforeseen circumstances may affect our schedule. EATING  DO NOT EAT OR DRINK ANYTHING AFTER MIDNIGHT ON THE EVENING BEFORE YOUR SURGERY OR ON THE DAY OF YOUR SURGERY except for your medications (as instructed) with a sip of water. Do not use gum, mints or lozenges on the morning of your surgery. Please do not smoke or chew tobacco before your surgery. MEDICATIONS   Take the following medications on the morning of your surgery with the smallest amount of water possible : NONE    STOP THESE MEDICATIONS AT THE TIMES LISTED BELOW  Blood thinner(s) : FISH OIL ;  Consult with medical doctor  Aspirin ;  7 days before                                                NSAIDS (Indocin, Ibuprofen, Naprosyn) ;  7 days before     DRIVING/TRANSPORATION  Have a responsible adult to drive you home from the hospital and to stay with you over night. Please have them plan to remain in the hospital during your surgery. Your surgery will not be done if you do not have a responsible adult to take you home and to stay with you. If you have arranged for public transport, you must have a responsible adult to ride with you (who is not the ). You may not drive for 24 hours after anesthesia. PREPARATION  If you have a Living WiIl/Advance Directive, please bring a copy with you to scan into your chart. Please DO NOT wear makeup or nail polish  Please leave valuables at home,  DO NOT wear jewelry.   Wear loose, comfortable clothing that is large enough to cover a bulky dressing. SPECIAL INSTRUCTIONS:  Shower with the Preoperative Skin Preparation CHLORHEXIDINE as instructed.     Reviewed above preoperative instructions and answered questions during preadmission testing consult appointment    Patient:  Lluvia Mathis   Date:     March 27, 2023  Time:   2:28 PM    RN:  Sujatha Atkins RN    Date:     March 27, 2023  Time:   2:28 PM

## 2023-04-09 PROBLEM — M17.12 PRIMARY OSTEOARTHRITIS OF LEFT KNEE: Status: ACTIVE | Noted: 2023-04-09

## 2023-04-11 ENCOUNTER — HOSPITAL ENCOUNTER (OUTPATIENT)
Age: 68
Discharge: HOME | End: 2023-04-12
Attending: ORTHOPAEDIC SURGERY | Admitting: ORTHOPAEDIC SURGERY
Payer: MEDICARE

## 2023-04-11 ENCOUNTER — APPOINTMENT (OUTPATIENT)
Dept: GENERAL RADIOLOGY | Age: 68
End: 2023-04-11
Attending: ORTHOPAEDIC SURGERY
Payer: MEDICARE

## 2023-04-11 DIAGNOSIS — M17.12 PRIMARY OSTEOARTHRITIS OF LEFT KNEE: Primary | ICD-10-CM

## 2023-04-11 PROCEDURE — 76210000006 HC OR PH I REC 0.5 TO 1 HR: Performed by: ORTHOPAEDIC SURGERY

## 2023-04-11 PROCEDURE — 74011250636 HC RX REV CODE- 250/636: Performed by: ORTHOPAEDIC SURGERY

## 2023-04-11 PROCEDURE — 77030018673: Performed by: ORTHOPAEDIC SURGERY

## 2023-04-11 PROCEDURE — 74011000258 HC RX REV CODE- 258: Performed by: ORTHOPAEDIC SURGERY

## 2023-04-11 PROCEDURE — C1713 ANCHOR/SCREW BN/BN,TIS/BN: HCPCS | Performed by: ORTHOPAEDIC SURGERY

## 2023-04-11 PROCEDURE — 76010000162 HC OR TIME 1.5 TO 2 HR INTENSV-TIER 1: Performed by: ORTHOPAEDIC SURGERY

## 2023-04-11 PROCEDURE — 97161 PT EVAL LOW COMPLEX 20 MIN: CPT

## 2023-04-11 PROCEDURE — 77030010783 HC BOWL MX BN CEM J&J -B: Performed by: ORTHOPAEDIC SURGERY

## 2023-04-11 PROCEDURE — 97116 GAIT TRAINING THERAPY: CPT

## 2023-04-11 PROCEDURE — 77030019557 HC ELECTRD VES SEAL MEDT -F: Performed by: ORTHOPAEDIC SURGERY

## 2023-04-11 PROCEDURE — 2709999900 HC NON-CHARGEABLE SUPPLY: Performed by: ORTHOPAEDIC SURGERY

## 2023-04-11 PROCEDURE — C1776 JOINT DEVICE (IMPLANTABLE): HCPCS | Performed by: ORTHOPAEDIC SURGERY

## 2023-04-11 PROCEDURE — 77030031139 HC SUT VCRL2 J&J -A: Performed by: ORTHOPAEDIC SURGERY

## 2023-04-11 PROCEDURE — 74011000250 HC RX REV CODE- 250: Performed by: ORTHOPAEDIC SURGERY

## 2023-04-11 PROCEDURE — 74011250637 HC RX REV CODE- 250/637: Performed by: ORTHOPAEDIC SURGERY

## 2023-04-11 PROCEDURE — 77030042316 HC BLD SAW -B: Performed by: ORTHOPAEDIC SURGERY

## 2023-04-11 PROCEDURE — 77030040922 HC BLNKT HYPOTHRM STRY -A: Performed by: ORTHOPAEDIC SURGERY

## 2023-04-11 PROCEDURE — 77030000032 HC CUF TRNQT ZIMM -B: Performed by: ORTHOPAEDIC SURGERY

## 2023-04-11 PROCEDURE — 77030013708 HC HNDPC SUC IRR PULS STRY –B: Performed by: ORTHOPAEDIC SURGERY

## 2023-04-11 PROCEDURE — 76060000034 HC ANESTHESIA 1.5 TO 2 HR: Performed by: ORTHOPAEDIC SURGERY

## 2023-04-11 PROCEDURE — 77030012935 HC DRSG AQUACEL BMS -B: Performed by: ORTHOPAEDIC SURGERY

## 2023-04-11 PROCEDURE — 77030028906 HC WRP KNEE W/GEL BGS SOLM -B: Performed by: ORTHOPAEDIC SURGERY

## 2023-04-11 PROCEDURE — 77030008463 HC STPLR SKN PROX J&J -B: Performed by: ORTHOPAEDIC SURGERY

## 2023-04-11 PROCEDURE — 73560 X-RAY EXAM OF KNEE 1 OR 2: CPT

## 2023-04-11 PROCEDURE — 97110 THERAPEUTIC EXERCISES: CPT

## 2023-04-11 DEVICE — SIGMA FEMORAL CRUCIATE RETAINING POROCOAT SIZE 3 LEFT
Type: IMPLANTABLE DEVICE | Site: KNEE | Status: FUNCTIONAL
Brand: SIGMA

## 2023-04-11 DEVICE — SIGMA TIBIAL INSERT ROTATING PLATFORM CURVED AOX SIZE 3 10MM
Type: IMPLANTABLE DEVICE | Site: KNEE | Status: FUNCTIONAL
Brand: SIGMA AOX

## 2023-04-11 DEVICE — SMARTSET GHV GENTAMICIN HIGH VISCOSITY BONE CEMENT 40G
Type: IMPLANTABLE DEVICE | Site: KNEE | Status: FUNCTIONAL
Brand: SMARTSET

## 2023-04-11 DEVICE — KNEE K3 TOT HYBRID IMPL CAPPED SYNTHES: Type: IMPLANTABLE DEVICE | Site: KNEE | Status: FUNCTIONAL

## 2023-04-11 DEVICE — LCS SIGMA TIBIAL TRAY ROTATING PLATFORM MBT KEEL SIZE 3 CEMENTED
Type: IMPLANTABLE DEVICE | Site: KNEE | Status: FUNCTIONAL
Brand: LCS SIGMA

## 2023-04-11 DEVICE — P.F.C. SIGMA OVAL DOME PATELLA 3-PEG 38MM CEMENTED
Type: IMPLANTABLE DEVICE | Site: KNEE | Status: FUNCTIONAL
Brand: P.F.C. SIGMA

## 2023-04-11 RX ORDER — PROMETHAZINE HYDROCHLORIDE 25 MG/1
12.5 TABLET ORAL
Status: DISCONTINUED | OUTPATIENT
Start: 2023-04-11 | End: 2023-04-12 | Stop reason: HOSPADM

## 2023-04-11 RX ORDER — GABAPENTIN 300 MG/1
900 CAPSULE ORAL ONCE
Status: COMPLETED | OUTPATIENT
Start: 2023-04-11 | End: 2023-04-11

## 2023-04-11 RX ORDER — SODIUM CHLORIDE 0.9 % (FLUSH) 0.9 %
5-40 SYRINGE (ML) INJECTION AS NEEDED
Status: DISCONTINUED | OUTPATIENT
Start: 2023-04-11 | End: 2023-04-11 | Stop reason: HOSPADM

## 2023-04-11 RX ORDER — GUAIFENESIN 100 MG/5ML
81 LIQUID (ML) ORAL 2 TIMES DAILY
Status: DISCONTINUED | OUTPATIENT
Start: 2023-04-11 | End: 2023-04-12 | Stop reason: HOSPADM

## 2023-04-11 RX ORDER — OXYCODONE AND ACETAMINOPHEN 5; 325 MG/1; MG/1
1 TABLET ORAL
Status: DISCONTINUED | OUTPATIENT
Start: 2023-04-11 | End: 2023-04-11 | Stop reason: HOSPADM

## 2023-04-11 RX ORDER — CELECOXIB 100 MG/1
200 CAPSULE ORAL ONCE
Status: COMPLETED | OUTPATIENT
Start: 2023-04-11 | End: 2023-04-11

## 2023-04-11 RX ORDER — POLYETHYLENE GLYCOL 3350 17 G/17G
17 POWDER, FOR SOLUTION ORAL DAILY PRN
Status: DISCONTINUED | OUTPATIENT
Start: 2023-04-11 | End: 2023-04-12 | Stop reason: HOSPADM

## 2023-04-11 RX ORDER — ACETAMINOPHEN 650 MG/1
650 SUPPOSITORY RECTAL
Status: DISCONTINUED | OUTPATIENT
Start: 2023-04-11 | End: 2023-04-12 | Stop reason: HOSPADM

## 2023-04-11 RX ORDER — SODIUM CHLORIDE 0.9 % (FLUSH) 0.9 %
5-40 SYRINGE (ML) INJECTION EVERY 8 HOURS
Status: DISCONTINUED | OUTPATIENT
Start: 2023-04-11 | End: 2023-04-11 | Stop reason: HOSPADM

## 2023-04-11 RX ORDER — DEXTROSE MONOHYDRATE AND SODIUM CHLORIDE 5; .45 G/100ML; G/100ML
75 INJECTION, SOLUTION INTRAVENOUS CONTINUOUS
Status: DISCONTINUED | OUTPATIENT
Start: 2023-04-11 | End: 2023-04-12

## 2023-04-11 RX ORDER — EZETIMIBE 10 MG/1
10 TABLET ORAL DAILY
Status: DISCONTINUED | OUTPATIENT
Start: 2023-04-12 | End: 2023-04-12 | Stop reason: HOSPADM

## 2023-04-11 RX ORDER — SERTRALINE HYDROCHLORIDE 50 MG/1
50 TABLET, FILM COATED ORAL DAILY
Status: DISCONTINUED | OUTPATIENT
Start: 2023-04-12 | End: 2023-04-12 | Stop reason: HOSPADM

## 2023-04-11 RX ORDER — HYDROCODONE BITARTRATE AND ACETAMINOPHEN 5; 325 MG/1; MG/1
1 TABLET ORAL
Status: DISCONTINUED | OUTPATIENT
Start: 2023-04-11 | End: 2023-04-12 | Stop reason: HOSPADM

## 2023-04-11 RX ORDER — SODIUM CHLORIDE 0.9 % (FLUSH) 0.9 %
5-40 SYRINGE (ML) INJECTION AS NEEDED
Status: DISCONTINUED | OUTPATIENT
Start: 2023-04-11 | End: 2023-04-12 | Stop reason: HOSPADM

## 2023-04-11 RX ORDER — LOSARTAN POTASSIUM 25 MG/1
25 TABLET ORAL DAILY
Status: DISCONTINUED | OUTPATIENT
Start: 2023-04-12 | End: 2023-04-12 | Stop reason: HOSPADM

## 2023-04-11 RX ORDER — ACETAMINOPHEN 500 MG
1000 TABLET ORAL ONCE
Status: COMPLETED | OUTPATIENT
Start: 2023-04-11 | End: 2023-04-11

## 2023-04-11 RX ORDER — SODIUM CHLORIDE 0.9 % (FLUSH) 0.9 %
5-40 SYRINGE (ML) INJECTION EVERY 8 HOURS
Status: DISCONTINUED | OUTPATIENT
Start: 2023-04-11 | End: 2023-04-12 | Stop reason: HOSPADM

## 2023-04-11 RX ORDER — PANTOPRAZOLE SODIUM 40 MG/1
40 TABLET, DELAYED RELEASE ORAL DAILY
Status: DISCONTINUED | OUTPATIENT
Start: 2023-04-12 | End: 2023-04-12 | Stop reason: HOSPADM

## 2023-04-11 RX ORDER — ONDANSETRON 2 MG/ML
4 INJECTION INTRAMUSCULAR; INTRAVENOUS
Status: DISCONTINUED | OUTPATIENT
Start: 2023-04-11 | End: 2023-04-12 | Stop reason: HOSPADM

## 2023-04-11 RX ORDER — ACETAMINOPHEN 325 MG/1
650 TABLET ORAL
Status: DISCONTINUED | OUTPATIENT
Start: 2023-04-11 | End: 2023-04-12 | Stop reason: HOSPADM

## 2023-04-11 RX ORDER — HYDROMORPHONE HYDROCHLORIDE 2 MG/ML
0.5 INJECTION, SOLUTION INTRAMUSCULAR; INTRAVENOUS; SUBCUTANEOUS AS NEEDED
Status: DISCONTINUED | OUTPATIENT
Start: 2023-04-11 | End: 2023-04-11 | Stop reason: HOSPADM

## 2023-04-11 RX ORDER — HYDROMORPHONE HYDROCHLORIDE 1 MG/ML
1 INJECTION, SOLUTION INTRAMUSCULAR; INTRAVENOUS; SUBCUTANEOUS
Status: DISCONTINUED | OUTPATIENT
Start: 2023-04-11 | End: 2023-04-12 | Stop reason: HOSPADM

## 2023-04-11 RX ORDER — ACYCLOVIR 200 MG/1
400 CAPSULE ORAL 2 TIMES DAILY
Status: DISCONTINUED | OUTPATIENT
Start: 2023-04-11 | End: 2023-04-12 | Stop reason: HOSPADM

## 2023-04-11 RX ORDER — SODIUM CHLORIDE, SODIUM LACTATE, POTASSIUM CHLORIDE, CALCIUM CHLORIDE 600; 310; 30; 20 MG/100ML; MG/100ML; MG/100ML; MG/100ML
100 INJECTION, SOLUTION INTRAVENOUS CONTINUOUS
Status: DISCONTINUED | OUTPATIENT
Start: 2023-04-11 | End: 2023-04-11 | Stop reason: HOSPADM

## 2023-04-11 RX ADMIN — SODIUM CHLORIDE, PRESERVATIVE FREE 10 ML: 5 INJECTION INTRAVENOUS at 14:42

## 2023-04-11 RX ADMIN — SODIUM CHLORIDE, POTASSIUM CHLORIDE, SODIUM LACTATE AND CALCIUM CHLORIDE 100 ML/HR: 600; 310; 30; 20 INJECTION, SOLUTION INTRAVENOUS at 10:45

## 2023-04-11 RX ADMIN — ACYCLOVIR 400 MG: 200 CAPSULE ORAL at 18:57

## 2023-04-11 RX ADMIN — CEFAZOLIN 1 G: 1 INJECTION, POWDER, FOR SOLUTION INTRAMUSCULAR; INTRAVENOUS at 19:01

## 2023-04-11 RX ADMIN — CELECOXIB 200 MG: 100 CAPSULE ORAL at 10:44

## 2023-04-11 RX ADMIN — SODIUM CHLORIDE, PRESERVATIVE FREE 10 ML: 5 INJECTION INTRAVENOUS at 14:41

## 2023-04-11 RX ADMIN — CEFAZOLIN 2 G: 2 INJECTION, POWDER, FOR SOLUTION INTRAMUSCULAR; INTRAVENOUS at 11:35

## 2023-04-11 RX ADMIN — ASPIRIN 81 MG 81 MG: 81 TABLET ORAL at 18:57

## 2023-04-11 RX ADMIN — HYDROCODONE BITARTRATE AND ACETAMINOPHEN 1 TABLET: 5; 325 TABLET ORAL at 16:23

## 2023-04-11 RX ADMIN — SODIUM CHLORIDE, PRESERVATIVE FREE 10 ML: 5 INJECTION INTRAVENOUS at 21:15

## 2023-04-11 RX ADMIN — ACETAMINOPHEN 1000 MG: 500 TABLET ORAL at 10:44

## 2023-04-11 RX ADMIN — GABAPENTIN 900 MG: 300 CAPSULE ORAL at 11:22

## 2023-04-11 RX ADMIN — DEXTROSE AND SODIUM CHLORIDE 75 ML/HR: 5; 450 INJECTION, SOLUTION INTRAVENOUS at 14:41

## 2023-04-11 NOTE — PROGRESS NOTES
Problem: Pain  Goal: *Control of Pain  Outcome: Progressing Towards Goal  Goal: *PALLIATIVE CARE:  Alleviation of Pain  Outcome: Progressing Towards Goal     Problem: Knee Replacement: Day of Surgery/Unit  Goal: Off Pathway (Use only if patient is Off Pathway)  Outcome: Progressing Towards Goal  Goal: Activity/Safety  Outcome: Progressing Towards Goal  Goal: Consults, if ordered  Outcome: Progressing Towards Goal  Goal: Diagnostic Test/Procedures  Outcome: Progressing Towards Goal  Goal: Nutrition/Diet  Outcome: Progressing Towards Goal  Goal: Medications  Outcome: Progressing Towards Goal  Goal: Respiratory  Outcome: Progressing Towards Goal  Goal: Treatments/Interventions/Procedures  Outcome: Progressing Towards Goal  Goal: Psychosocial  Outcome: Progressing Towards Goal  Goal: *Initiate mobility  Outcome: Progressing Towards Goal  Goal: *Optimal pain control at patient's stated goal  Outcome: Progressing Towards Goal  Goal: *Hemodynamically stable  Outcome: Progressing Towards Goal

## 2023-04-11 NOTE — PROGRESS NOTES
Problem: Mobility Impaired (Adult and Pediatric)  Goal: *Acute Goals and Plan of Care (Insert Text)  Description: FUNCTIONAL STATUS PRIOR TO ADMISSION: Patient was independent and active without use of DME.    HOME SUPPORT PRIOR TO ADMISSION: The patient lived with spouse but did not require assist. Currently sister from [facility] is staying w/ pt to help post-op TKR. Physical Therapy Goals  Initiated 4/11/2023    1. Patient will move from supine to sit and sit to supine  in bed with modified independence within 4 days. 2. Patient will perform sit to stand with SBA within 4 days. 3. Patient will ambulate with SBA for 200 feet with the least restrictive device within 4 days. 4. Patient will ascend/descend 4 stairs with L handrail(s) with SBA within 4 days. 5. Patient will perform home exercise program per protocol with SBA within 4 days. 6. Patient will demonstrate AROM 0-90 degrees in operative joint within 4 days. Outcome: Not Met     Problem: Patient Education: Go to Patient Education Activity  Goal: Patient/Family Education  Outcome: Not Met  PHYSICAL THERAPY EVALUATION  Patient: Roma Mora (77 y.o. female)  Date: 4/11/2023  Primary Diagnosis: Primary osteoarthritis of left knee [M17.12]  Procedure(s) (LRB):  TOTAL LEFT KNEE REPLACEMENT (Left) Day of Surgery   Precautions:   Bed Alarm, Fall, WBAT      ASSESSMENT  Based on the objective data described below, the patient presents with impairments in L LE AROM and strength, impaired functional mobility and balance, reduced activity tolerance and pt demonstrated gait abnormalities w/ AD. She presented pleasant and cooperative. Vitals noted prior to mobilization activities included: /86, HR 66 and O2 sats on RA 94%. Pt was guided through a few supine therex for L LE prior to transferring to EOB. Therapist demonstrated proper safety and technique for sit to stand transfers and well as sequencing when walking w/ 2WW.  Pt was able to walk down the hallway w/ 2WW first w/ a discontinuous step to gait pattern but she transitioned to a fluid and reciprocal step through gait pattern on her own; no LOB, no SOB. Upon returning to the room pt was set up in the recliner chair w/ tray table and call bell in reach; chair alarm on and fresh ice applied to L knee. She is already scheduled to start outpt PT on Thursday. Current Level of Function Impacting Discharge (mobility/balance): transfers min A to CGA; balance w/ AD fair    Functional Outcome Measure: The patient scored Total: 65/100 on the Barthel Index outcome measure which is indicative of being minimally dependent in basic self-care. Other factors to consider for discharge: independent at baseline w/ good family support     Patient will benefit from skilled therapy intervention to address the above noted impairments. PLAN :  Recommendations and Planned Interventions: bed mobility training, transfer training, gait training, therapeutic exercises, neuromuscular re-education, modalities, edema management/control, patient and family training/education, and therapeutic activities      Frequency/Duration: Patient will be followed by physical therapy:  6 times a week to address goals. Recommendation for discharge: (in order for the patient to meet his/her long term goals)  Outpatient physical therapy follow up recommended for ROM and strengthening s/p L TKR. This discharge recommendation:  Has not yet been discussed the attending provider and/or case management    IF patient discharges home will need the following DME: patient owns DME required for discharge         SUBJECTIVE:   Patient stated I'm doing ok.     OBJECTIVE DATA SUMMARY:   HISTORY:    Past Medical History:   Diagnosis Date    Coronary atherosclerosis of native coronary artery 3/22/2011    Essential hypertension, benign 3/22/2011    GERD (gastroesophageal reflux disease)     Menopause     age 46    Mixed hyperlipidemia 3/22/2011 Reflux esophagitis 3/22/2011     Past Surgical History:   Procedure Laterality Date    HX BREAST BIOPSY Left     2002    HX COLONOSCOPY  2009    normal    HX HEART CATHETERIZATION  2003    RCA >90% stenosis    HX KNEE ARTHROSCOPY Left 08/2022    HX PARTIAL HYSTERECTOMY      age 39    HX PTCA  2003    stent to RCA. Hepacoat stent    STRESS TEST MYOVIEW  2006    no fixed or reversible deficits. Gated EF (%): 65.        Personal factors and/or comorbidities impacting plan of care: none    Home Situation  Home Environment: Private residence  # Steps to Enter: 4  Rails to Enter: Yes  Hand Rails : Left  Wheelchair Ramp: No  One/Two Story Residence: One story  Living Alone: No  Support Systems: Spouse/Significant Other  Patient Expects to be Discharged to[de-identified] Home with outpatient services  Current DME Used/Available at Home: Commode, bedside, Walker, rolling    EXAMINATION/PRESENTATION/DECISION MAKING:   Critical Behavior:  Neurologic State: Alert  Orientation Level: Oriented X4  Cognition: Appropriate decision making, Follows commands  Safety/Judgement: Fall prevention  Hearing: Auditory  Auditory Impairment: None    Range Of Motion:  AROM: Generally decreased, functional                       Strength:    Strength: Generally decreased, functional                    Tone & Sensation:   Tone: Normal              Sensation: Intact               Coordination:  Coordination: Generally decreased, functional  Vision:   Corrective Lenses: Glasses  Functional Mobility:  Bed Mobility:     Supine to Sit: Minimum assistance     Scooting: Contact guard assistance  Transfers:  Sit to Stand: Contact guard assistance  Stand to Sit: Contact guard assistance        Bed to Chair: Contact guard assistance              Balance:   Sitting: Intact  Standing: Impaired; With support  Standing - Static: Constant support; Fair  Standing - Dynamic : Constant support; Fair  Ambulation/Gait Training:  Distance (ft): 150 Feet (ft)  Assistive Device: Gait belt;Walker, rolling  Ambulation - Level of Assistance: Contact guard assistance     Gait Description (WDL): Exceptions to WDL  Gait Abnormalities: Antalgic;Decreased step clearance; Step to gait     Left Side Weight Bearing: As tolerated  Base of Support: Widened;Center of gravity altered;Shift to right     Speed/Mary: Pace decreased (<100 feet/min)  Step Length: Left shortened;Right shortened                    Therapeutic Exercises:   Supine therex included: ankle pumps, QS, GS, heel slides, SAQs and hip abd/add; each completed for 1-2 sets and reps up to 12 as tolerated    Functional Measure:  Barthel Index:    Bathin  Bladder: 10  Bowels: 10  Groomin  Dressin  Feeding: 10  Mobility: 10  Stairs: 0  Toilet Use: 5  Transfer (Bed to Chair and Back): 10  Total: 65/100       The Barthel ADL Index: Guidelines  1. The index should be used as a record of what a patient does, not as a record of what a patient could do. 2. The main aim is to establish degree of independence from any help, physical or verbal, however minor and for whatever reason. 3. The need for supervision renders the patient not independent. 4. A patient's performance should be established using the best available evidence. Asking the patient, friends/relatives and nurses are the usual sources, but direct observation and common sense are also important. However direct testing is not needed. 5. Usually the patient's performance over the preceding 24-48 hours is important, but occasionally longer periods will be relevant. 6. Middle categories imply that the patient supplies over 50 per cent of the effort. 7. Use of aids to be independent is allowed. Score Interpretation (from [address] [place] [address])    Independent   60-79 Minimally independent   40-59 Partially dependent   20-39 Very dependent   <20 Totally dependent     -Lucero Pat., Barthel, D.W. (1965). Functional evaluation: the Barthel Index.  [address] (1997). The Barthel activities of daily living index: self-reporting versus actual performance in the old (> or = 75 years). Journal [address] [address]), [facility], [place], Cory Evans., Marisa Velez. (). Measuring the change in disability after inpatient rehabilitation; comparison of the responsiveness of the Barthel Index and Functional [place] Measure. Journal of Neurology, Neurosurgery, and Psychiatry, 66(4), 980-530. JASVIR Mccullough, OSIEL Child, & Hans Ramirez M.A. (2004) Assessment of post-stroke quality of life in cost-effectiveness studies: The usefulness of the Barthel Index and the EuroQoL-5D. Quality of Life Research, 15, 726-76        Physical Therapy Evaluation Charge Determination   History Examination Presentation Decision-Making   MEDIUM  Complexity : 1-2 comorbidities / personal factors will impact the outcome/ POC  LOW Complexity : 1-2 Standardized tests and measures addressing body structure, function, activity limitation and / or participation in recreation  LOW Complexity : Stable, uncomplicated  LOW Complexity : FOTO score of       Based on the above components, the patient evaluation is determined to be of the following complexity level: LOW     Pain Ratin-6/10 L knee; RN aware and gave meds; ice applied at end of session    Activity Tolerance:   Fair, SpO2 stable on RA, and requires rest breaks    After treatment patient left in no apparent distress:   Sitting in chair, Heels elevated for pressure relief, Call bell within reach, and Bed / chair alarm activated    COMMUNICATION/EDUCATION:   The patients plan of care was discussed with: Registered nurse and Rehabilitation technician. Fall prevention education was provided and the patient/caregiver indicated understanding., Patient/family have participated as able in goal setting and plan of care. , and Patient/family agree to work toward stated goals and plan of care.     Thank you for this referral.  Argenis Tidwell, PT   Time Calculation: 44 mins

## 2023-04-11 NOTE — OP NOTES
4/11/2023  OPERATIVE REPORT      PREOPERATIVE DIAGNOSIS: Osteoarthritis, left knee. POSTOPERATIVE DIAGNOSIS: Osteoarthritis, left knee. OPERATIVE PROCEDURE: left total knee replacement. SURGEON: Johnson Romero MD    ASSISTANT SURGEON: Rosemarie    ANESTHESIA: reg/ lma    INDICATIONS: : A 79 y.o.  female  with end stage osteoarthritis to the left knee, not responsive to conservative management. COMPLICATIONS:None    PROCEDURE: The patient was taken to the operating room, underwent  placement of anesthesia. The knee and leg were prepped and  draped in the usual fashion. A longitudinal midline  incision made down through skin and subcutaneous tissue. A medial  parapatellar arthrotomy was performed, and extended proximally along the  medial border of the quadriceps tendon, distally along the medial border of  the insertion of the patella tendon. Medial release was performed. Retropatellar fat pad was sharply excised. The patella was subluxated  laterally, the knee was flexed to 90 degrees. Drill was used to enter the  intramedullary canal of the distal femur. The 5 degree intramedullary guide  was applied and the distal femoral cut was performed. The femur was sized  to a 3. A 3 cutting block was applied, and the anterior, posterior,  chamfer cuts were performed. The extramedullary tibial guide was applied, and the tibia was cut in neutral alignment. The tibia was sized to a 3. Knee was balanced to varus  and valgus stress. Flexion and extension gaps were equal. Trial reduction  was performed, using 10 mm insert. Excellent range of motion and stability  were noted. The patella was everted, and the patella was cut using   Patella cutting guide. Patella was sized to a 38. Drill holes were placed, and patella  button applied. The patella tracked normally. All trial components were  removed, and surfaces were prepared using drill and punch. All residual  meniscal tissue was sharply excised.  Hemostasis was obtained using Bovie  apparatus. Tibia and patella were cemented in place, taking care to remove all  excess cement. The femoral component was pressfit into place. The knee was maintained in full extension while the cement hardened. The tourniquet was let down after a total tourniquet time of 0 minutes. Hemostasis was obtained using the Bovie apparatus. The joint was  extensively irrigated with antibiotic solution. The arthrotomy was repaired  using #2 Vicryl in interrupted figure-of-eight fashion. Subcutaneous tissue  was approximated using 2-0 Vicryl in interrupted fashion. Skin edges were  approximated using stapling apparatus. Joint was infiltrated with 60 mL of  0.5% Marcaine with epinephrine and Toradol. Bulky sterile dressing was applied, and the  patient was transferred to recovery room in stable condition. There were no  complications. ESTIMATED BLOOD LOSS: 200 cc.     SPECIMEN: Jagdeep López M.D.  4/11/2023  1:41 PM

## 2023-04-11 NOTE — ROUTINE PROCESS
Please send out result letter with the following note, thanks.    Alisia,    STD screening was negative for chlamydia, gonorrhea, HIV, and syphilis.  Condoms are the best way to prevent transmission of STDs.    Your blood sugar was normal indicating you do not have diabetes.    Wet prep shows bacterial vaginosis, I sent the flagyl medication into your pharmacy.  I tried to call you but the number was not working.    Normal kidney function.        -Sepideh Hines, CNP TRANSFER - OUT REPORT:    Verbal report given to Jaiden Mills rn(name) on Analy Pillai  being transferred to  110(unit) for routine post - op       Report consisted of patients Situation, Background, Assessment and   Recommendations(SBAR). Information from the following report(s) OR Summary, Procedure Summary, Intake/Output, and MAR was reviewed with the receiving nurse. Lines:   Peripheral IV 04/11/23 Anterior;Right Forearm (Active)   Site Assessment Clean, dry, & intact 04/11/23 1343   Phlebitis Assessment 0 04/11/23 1343   Infiltration Assessment 0 04/11/23 1343   Dressing Status Clean, dry, & intact 04/11/23 1343   Dressing Type Transparent 04/11/23 1343   Hub Color/Line Status Infusing 04/11/23 1200        Opportunity for questions and clarification was provided.       Patient transported with:   Registered Nurse

## 2023-04-11 NOTE — PROGRESS NOTES
Bedside and Verbal shift change report given to KAMI Porter LPN (oncoming nurse) by Raeann Stein RN (offgoing nurse). Report included the following information SBAR, Kardex, MAR, and Recent Results.

## 2023-04-11 NOTE — BRIEF OP NOTE
Brief Postoperative Note    Patient: Jose Bergeron  YOB: 1955  MRN: [id number]    Date of Procedure: 4/11/2023     Pre-Op Diagnosis: LEFT KNEE OSTEOARTHRITIS    Post-Op Diagnosis: Same as preoperative diagnosis. Procedure(s):  TOTAL LEFT KNEE REPLACEMENT    Surgeon(s):  Izabella Mcleod MD    Surgical Assistant: None    Anesthesia: General     Estimated Blood Loss (mL): 332     Complications: None    Specimens: * No specimens in log *     Implants:   Implant Name Type Inv.  Item Serial No.  Lot No. LRB No. Used Action   CEMENT BNE 40GM FULL DOSE PMMA W/ GENT HI VISC RADPQ LNG - [id number] Cement CEMENT BNE 40GM FULL DOSE PMMA W/ GENT HI VISC RADPQ LNG  [facility] [id number] Left 1 Implanted   COMPNT FEM CR PFC POR SZ 3 LT -- SIGMA - [id number] Joint Component COMPNT FEM CR PFC POR SZ 3 LT -- SIGMA  [facility] [id number] Left 1 Implanted   SIG RP AOX CRV INS SZ3 10 - [id number] Joint Component SIG RP AOX CRV INS SZ3 10  [facility] [id number] Left 1 Implanted   TRAY TIB SZ 3 KNEE LEO KEELED MOB BEAR REV LCS COMPLT - [id number] Joint Component TRAY TIB SZ 3 KNEE LEO KEELED MOB BEAR REV LCS COMPLT  [facility] [id number] Left 1 Implanted   COMPONENT PAT [id number] DST POLY OVL DOME 3 PEG NP LEO MOD - [id number] Joint Component COMPONENT PAT [id number] DST POLY OVL DOME 3 PEG NP LEO MOD  [facility] [id number] Left 1 Implanted       Drains: * No LDAs found *    Findings: Severe joint space loss laterally    Electronically Signed by Shameka Byrne MD on 4/11/2023 at 1:40 PM

## 2023-04-11 NOTE — PROGRESS NOTES
Outpatient PT order sent to [facility] and spoke with Chery Leija and made her aware to look out for order.  Patient has jagdish set up for 4/13 at 10:30am.

## 2023-04-12 VITALS
TEMPERATURE: 98.2 F | HEART RATE: 70 BPM | HEIGHT: 67 IN | BODY MASS INDEX: 33.27 KG/M2 | RESPIRATION RATE: 18 BRPM | WEIGHT: 212 LBS | OXYGEN SATURATION: 98 % | SYSTOLIC BLOOD PRESSURE: 146 MMHG | DIASTOLIC BLOOD PRESSURE: 68 MMHG

## 2023-04-12 PROBLEM — M17.12 PRIMARY OSTEOARTHRITIS OF LEFT KNEE: Status: RESOLVED | Noted: 2023-04-11 | Resolved: 2023-04-12

## 2023-04-12 PROCEDURE — 97535 SELF CARE MNGMENT TRAINING: CPT

## 2023-04-12 PROCEDURE — 74011250637 HC RX REV CODE- 250/637: Performed by: ORTHOPAEDIC SURGERY

## 2023-04-12 PROCEDURE — 74011000258 HC RX REV CODE- 258: Performed by: ORTHOPAEDIC SURGERY

## 2023-04-12 PROCEDURE — 74011250636 HC RX REV CODE- 250/636: Performed by: ORTHOPAEDIC SURGERY

## 2023-04-12 PROCEDURE — 97110 THERAPEUTIC EXERCISES: CPT

## 2023-04-12 PROCEDURE — 97165 OT EVAL LOW COMPLEX 30 MIN: CPT

## 2023-04-12 RX ORDER — HYDROCODONE BITARTRATE AND ACETAMINOPHEN 5; 325 MG/1; MG/1
1 TABLET ORAL
Qty: 20 TABLET | Refills: 0 | Status: SHIPPED | OUTPATIENT
Start: 2023-04-12 | End: 2023-04-15

## 2023-04-12 RX ORDER — IBUPROFEN 800 MG/1
800 TABLET ORAL
Qty: 60 TABLET | Refills: 2 | Status: SHIPPED | OUTPATIENT
Start: 2023-04-12

## 2023-04-12 RX ADMIN — PANTOPRAZOLE SODIUM 40 MG: 40 TABLET, DELAYED RELEASE ORAL at 08:40

## 2023-04-12 RX ADMIN — ATORVASTATIN CALCIUM 60 MG: 40 TABLET, FILM COATED ORAL at 08:40

## 2023-04-12 RX ADMIN — ASPIRIN 81 MG 81 MG: 81 TABLET ORAL at 08:40

## 2023-04-12 RX ADMIN — ACYCLOVIR 400 MG: 200 CAPSULE ORAL at 08:40

## 2023-04-12 RX ADMIN — HYDROCODONE BITARTRATE AND ACETAMINOPHEN 1 TABLET: 5; 325 TABLET ORAL at 05:03

## 2023-04-12 RX ADMIN — EZETIMIBE 10 MG: 10 TABLET ORAL at 08:40

## 2023-04-12 RX ADMIN — CEFAZOLIN 1 G: 1 INJECTION, POWDER, FOR SOLUTION INTRAMUSCULAR; INTRAVENOUS at 01:51

## 2023-04-12 RX ADMIN — SERTRALINE 50 MG: 50 TABLET, FILM COATED ORAL at 08:40

## 2023-04-12 RX ADMIN — HYDROCODONE BITARTRATE AND ACETAMINOPHEN 1 TABLET: 5; 325 TABLET ORAL at 10:02

## 2023-04-12 RX ADMIN — LOSARTAN POTASSIUM 25 MG: 25 TABLET, FILM COATED ORAL at 08:40

## 2023-04-12 NOTE — PROGRESS NOTES
Discharge instructions reviewed with Patient  Personal belongings returned: yes  Home meds returned: N/A  To front entrance via wheelchair   Discharged home with   @ [phone]

## 2023-04-12 NOTE — PROGRESS NOTES
Bedside and Verbal shift change report given to Josiah Palacios (oncoming nurse) by Robi Rascon (offgoing nurse). Report included the following information SBAR, Kardex, ED Summary, OR Summary, MAR, Alarm Parameters , Procedure Verification, and Quality Measures.

## 2023-04-12 NOTE — DISCHARGE INSTRUCTIONS
Manuel Vargas MD    After [address]:    Discharge Instructions Knee Replacement-[facility]    Patient Name  Roro Snyder  Date of procedure  4/11/2023    Procedure  Procedure(s):  TOTAL LEFT KNEE REPLACEMENT  Surgeon  Surgeon(s) and Role:     * Romie Braswell MD - Primary  Date of discharge: [unfilled]  PCP: @PCP@    Follow up appointments  Follow up with Dr. Padron [facility] in 2 weeks. Call [phone] to make an appointment. If home health has been arranged for you the agency will contact you to arrange dates/times for visits. Please call them if you do not hear from them within 24 hours after you are discharged    When to call your Orthopaedic Surgeon: Call [phone]. If you call after 5pm or on a weekend, the on call physician will be contacted  Unrelieved pain  Signs of infection-if your incision is red; continues to have drainage; drainage has a foul odor or if you have a persistent fever over 101 degrees  Signs of a blood clot in your leg-calf pain, tenderness, redness, swelling of lower leg    When to call your Primary Care Physician:  Concerns about medical conditions such as diabetes, high blood pressure, asthma, congestive heart failure  Call if blood sugars are elevated, persistent headache or dizziness, coughing or congestion, constipation or diarrhea, burning with urination, abnormal heart rate    When to call [phone] go to the nearest emergency room  Acute onset of chest pain, shortness of breath, difficulty breathing      Activity  Weight bearing as tolerated with walker or crutches. Complete your Home Exercise Program daily as instructed by your therapist.    Get up every one hour and walk (except at night when sleeping)  Do not drive or operate heavy machinery      Incision Care  You will have staples in your knee incision; they will be removed at the surgeons office visit in 2 weeks.  Leave the occlusive dressing in place for at least 7 days or for 2 weeks if it remains intact and not causing irritation. You may shower with the dressing in place. After 7 days, if the dressing has been removed, you may shower and get your incision wet but do not submerge your incision under water in a bath tub, hot tub or swimming pool for 6 weeks after surgery. Preventing blood clots  Take Aspirin 81 mg. twice a day as a blood thinner    Pain management  Take pain medication as prescribed; decrease the amount you use as your pain lessens  Avoid alcoholic beverages while taking pain medication  Please be aware that many medications contain Tylenol. We do not want you to over medicate so please read the information below as a guide. Do not take more than 4 Grams of Tylenol in a 24 hour period. (There are 1000 milligrams in one Gram)  Percocet contains 325 mg of Tylenol per tablet (do not take more than 12 tablets in 24 hours)  Norco contains 325 mg of Tylenol per tablet (do not take more than 12 tablets in 24 hours). You may place an ice bag on your knee for 15-20 minutes after exercising    Diet  Resume usual diet; drink plenty of fluids; eat foods high in fiber  You may want to take a stool softener (such as Senokot-S or Colace) to prevent constipation while you are taking pain medication. If constipation occurs, take a laxative (such as Dulcolax tablets, Milk of Magnesia, or a suppository)    [facility] Protocol [facility]  Nursing-per physicians order  Complete head to toe assessment, vital signs  Staples will be removed in the surgeons office at 2 week visit  Medication reconciliation  Review pain management  Manage chronic medical conditions    Physical Therapy-per physicians order  Weight bearing status:  Precautions at Admission: Bed Alarm, Fall, WBAT  Left Side Weight Bearing: As tolerated     ?   Mobility Status:  Supine to Sit: Minimum assistance  Sit to Stand: Contact guard assistance     Bed to Chair: Contact guard assistance  Gait:  Distance (ft): 150 Feet (ft)  Ambulation - Level of Assistance: Contact guard assistance  Assistive Device: Gait belt, Walker, rolling  Gait Abnormalities: Antalgic, Decreased step clearance, Step to gait  ADL status overall composite:                Physical Therapy  Assessment and evaluation-bed mobility; functional transfers (bed, chair, bathroom, stairs); ambulation with equipment, car transfers, shower transfers, safety and ability to get out of house in the event of an emergency  Review weight bearing as tolerated, wean from walker or crutches as tolerated  Discuss pain management  Review how to do ADLs.      Home Exercise Program

## 2023-04-12 NOTE — DISCHARGE SUMMARY
Discharge Summary     Patient: Chip Corona MRN: [id number]  SSN: [id number]    YOB: 1955  Age: 79 y.o. Sex: female       Admit Date: 4/11/2023    Discharge Date: 4/12/2023      Admission Diagnoses: Primary osteoarthritis of left knee [M17.12]    Discharge Diagnoses:   Problem List as of 4/12/2023 Date Reviewed: 4/12/2023            Codes Class Noted - Resolved    Reflux esophagitis ICD-10-CM: K21.00  ICD-9-CM: 530.11  3/22/2011 - Present        Mixed hyperlipidemia ICD-10-CM: E78.2  ICD-9-CM: 272.2  3/22/2011 - Present        Essential hypertension, benign ICD-10-CM: I10  ICD-9-CM: 401.1  3/22/2011 - Present        Coronary atherosclerosis of native coronary artery ICD-10-CM: I25.10  ICD-9-CM: 414.01  3/22/2011 - Present        RESOLVED: Primary osteoarthritis of left knee ICD-10-CM: M17.12  ICD-9-CM: 715.16  4/11/2023 - 4/12/2023        * (Principal) RESOLVED: Primary osteoarthritis of left knee ICD-10-CM: M17.12  ICD-9-CM: 715.16  4/9/2023 - 4/11/2023        RESOLVED: Medial meniscus, anterior horn derangement ICD-10-CM: M23.319  ICD-9-CM: 717.1  8/9/2022 - 8/9/2022        RESOLVED: Acute lateral meniscus tear of left knee ICD-10-CM: X41.724D  ICD-9-CM: 836.1  8/8/2022 - 8/9/2022        RESOLVED: Colon cancer screening ICD-10-CM: Z12.11  ICD-9-CM: V76.51  6/8/2020 - 7/8/2020            Discharge Condition: Good    Hospital Course: Had left TKA yeserday. Has done well postop. Will get additional PT today and should be stable for discharge this afternoon with out pt PT. She has intact 2+ pulses and intact neuro function. Consults: None    Disposition: home    Discharge Medications:   Current Discharge Medication List        START taking these medications    Details   HYDROcodone-acetaminophen (NORCO) 5-325 mg per tablet Take 1 Tablet by mouth every four (4) hours as needed for Pain for up to 3 days. Max Daily Amount: 6 Tablets.   Qty: 20 Tablet, Refills: 0    Associated Diagnoses: Primary osteoarthritis of left knee      ibuprofen (MOTRIN) 800 mg tablet Take 1 Tablet by mouth every six (6) hours as needed for Pain. Qty: 60 Tablet, Refills: 2           CONTINUE these medications which have NOT CHANGED    Details   melatonin 5 mg cap capsule Take 1 Capsule by mouth nightly. losartan (COZAAR) 25 mg tablet Take 1 Tab by mouth daily. Qty: 90 Tab, Refills: 1      acyclovir (ZOVIRAX) 400 mg tablet Take 1 Tablet by mouth two (2) times a day. aspirin delayed-release 81 mg tablet Take  by mouth daily. multivitamin (ONE A DAY) tablet Take 1 Tablet by mouth daily. atorvastatin (LIPITOR) 40 mg tablet Take 1.5 Tablets by mouth daily. ezetimibe (ZETIA) 10 mg tablet Take  by mouth. OMEGA-3 FATTY ACIDS (OMEGA 3 PO) Take 2,400 mg by mouth two (2) times a day. sertraline (ZOLOFT) 50 mg tablet Take  by mouth daily. OMEPRAZOLE (PRILOSEC PO) Take 20 mg by mouth daily. CALCIUM CARBONATE/VITAMIN D3 (CALCIUM 600 + D,3, PO) Take  by mouth. cholecalciferol (VITAMIN D3) 25 mcg (1,000 unit) cap Take 2 Capsules by mouth daily. POLYPODIUM LEUCOTOMOS EXTRACT (HELIOCARE PO) Take  by mouth daily. STOP taking these medications       Ibuprofen-diphenhydrAMINE (Advil PM) 200-38 mg tab Comments:   Reason for Stopping:               Activity: Activity as tolerated  Diet: Regular Diet  Wound Care: Keep wound clean and dry    Follow-up Appointments   Procedures    FOLLOW UP VISIT Appointment in: Two Weeks     Standing Status:   Standing     Number of Occurrences:   1     Order Specific Question:   Appointment in     Answer:    Two Weeks       Signed By: Mukul Sue MD     April 12, 2023

## 2023-04-12 NOTE — PROGRESS NOTES
S: Alert, comfortable    O: Afeb, vss, dressing dry, 2+ DP pulse, Xray good position of components. A: stable Postop    P: PT today and in am. Plan discharge tomorrow.

## 2023-04-12 NOTE — PROGRESS NOTES
Problem: Pain  Goal: *Control of Pain  Outcome: Progressing Towards Goal  Goal: *PALLIATIVE CARE:  Alleviation of Pain  Outcome: Progressing Towards Goal     Problem: Knee Replacement: Post-Op Day 1  Goal: Off Pathway (Use only if patient is Off Pathway)  Outcome: Progressing Towards Goal  Goal: Activity/Safety  Outcome: Progressing Towards Goal  Goal: Diagnostic Test/Procedures  Outcome: Progressing Towards Goal  Goal: Nutrition/Diet  Outcome: Progressing Towards Goal  Goal: Medications  Outcome: Progressing Towards Goal  Goal: Respiratory  Outcome: Progressing Towards Goal  Goal: Treatments/Interventions/Procedures  Outcome: Progressing Towards Goal  Goal: Psychosocial  Outcome: Progressing Towards Goal  Goal: Discharge Planning  Outcome: Progressing Towards Goal  Goal: *Demonstrates progressive activity  Outcome: Progressing Towards Goal  Goal: *Optimal pain control at patient's stated goal  Outcome: Progressing Towards Goal  Goal: *Hemodynamically stable  Outcome: Progressing Towards Goal  Goal: *Discharge plan identified  Outcome: Progressing Towards Goal

## 2023-04-12 NOTE — PROGRESS NOTES
Care Management Interventions  PCP Verified by CM: Yes  Palliative Care Criteria Met (RRAT>21 & CHF Dx)?: No (No MD order)  Mode of Transport at Discharge: Other (see comment) (POV)  Transition of Care Consult (CM Consult): Discharge Planning  MyChart Signup: No  Discharge Durable Medical Equipment: No  Physical Therapy Consult: Yes  Occupational Therapy Consult: Yes  Speech Therapy Consult: No  Support Systems: Spouse/Significant Other  Confirm Follow Up Transport: Family  The Plan for Transition of Care is Related to the Following Treatment Goals : Treat LKR  Discharge Location  Patient Expects to be Discharged to[de-identified] Home with outpatient services    Patient is being discharged home with outpatient services through [facility]. Patient and family aware and agrees with dc plan. She is aware to contact CM for any questions or concerns post discharge. RRAT: 4     Transition of Care (SONAL) Plan:  Home w/outpatient therapy     SONAL Transportation:       How is patient being transported at discharge? POV      When? Today      Is transport scheduled? N/a     Follow-up appointment and transportation:     Specialist? Radha Thurman MD 4/26/23 at 10:15am      [facility]: 4/13/23 at 10:30am     Who is transporting to the follow-up appointment? POV       Is transport for follow up appointment scheduled? N/a     Communication plan (with patient/family): Patient is aware and agrees with dc plan. Who is being called? Patient or Next of Kin? Responsible party? Patient     What number(s) is to be used? [phone]      What service provider is calling for [place]? N/a       When are they calling?  24--48 hours prior to jagdish       Click here to complete [address] including selection of the Healthcare Decision Maker Relationship (ie \"Primary\")  [email or web address]

## 2023-04-12 NOTE — PROGRESS NOTES
Problem: Mobility Impaired (Adult and Pediatric)  Goal: *Acute Goals and Plan of Care (Insert Text)  Description: FUNCTIONAL STATUS PRIOR TO ADMISSION: Patient was independent and active without use of DME.    HOME SUPPORT PRIOR TO ADMISSION: The patient lived with spouse but did not require assist. Currently sister from [place] is staying w/ pt to help post-op TKR. Physical Therapy Goals  Initiated 4/11/2023    1. Patient will move from supine to sit and sit to supine  in bed with modified independence within 4 days. 2. Patient will perform sit to stand with SBA within 4 days. 3. Patient will ambulate with SBA for 200 feet with the least restrictive device within 4 days. 4. Patient will ascend/descend 4 stairs with L handrail(s) with SBA within 4 days. 5. Patient will perform home exercise program per protocol with SBA within 4 days. 6. Patient will demonstrate AROM 0-90 degrees in operative joint within 4 days. Outcome: Resolved/Met  PHYSICAL THERAPY TREATMENT/DISCHARGE  Patient: Bassem Cantu (5 y.o. female)  Date: 4/12/2023  Diagnosis: Primary osteoarthritis of left knee [M17.12] Primary osteoarthritis of left knee  Procedure(s) (LRB):  TOTAL LEFT KNEE REPLACEMENT (Left) 1 Day Post-Op  Precautions: Bed Alarm, Fall, WBAT  Chart, physical therapy assessment, plan of care and goals were reviewed. ASSESSMENT  Patient continues with skilled PT services and has progressed towards goals. Pt presented sitting up in the recliner chair; pleasant and cooperative w/ ice on L knee. Seated therex reviewed prior to getting up. Pt was able to get up and walk down the hallway to the satellite rehab gym w/ 2WW to practice stairs. Pt was able to ascend/descent 4 steps x2 reps first w/ B HR then w/ just L HR; SBA only. Therapist offered the cues \"up w/ good. .. down w/ bad\"; no LOB, no SOB. Pt was guided back to her room and requested to lay back down. Ice reapplied to L knee.  Pt was left resting w/ both tray table and call bell in reach. Vehicle transfers were verbally reviewed. She is cleared for d/c home by PT. Vitals noted during session included: O2 sats on %, HR 98 and /67. Other factors to consider for discharge: supportive family; independent at baseline         PLAN :  Patient will be discharged from acute skilled physical therapy at this time. Rationale for discharge:  Goals achieved; sufficient for d/c home    Recommendation for discharge: (in order for the patient to meet his/her long term goals)  Outpatient physical therapy follow up recommended for AROM and strengthening s/p L TKR. This discharge recommendation:  Has been made in collaboration with the attending provider and/or case management    IF patient discharges home will need the following DME: patient owns DME required for discharge       SUBJECTIVE:   Patient stated I've been working on my exercises.     OBJECTIVE DATA SUMMARY:   Critical Behavior:  Neurologic State: Alert  Orientation Level: Oriented X4  Cognition: Follows commands  Safety/Judgement: Fall prevention  Functional Mobility Training:  Bed Mobility:  Rolling: Independent  Supine to Sit: Independent  Sit to Supine: Stand-by assistance (using the \"hook\" technique)  Scooting: Modified independent        Transfers:  Sit to Stand: Stand-by assistance  Stand to Sit: Stand-by assistance        Bed to Chair: Stand-by assistance                    Balance:  Sitting: Intact  Standing: Impaired; With support  Standing - Static: Constant support; Fair  Standing - Dynamic : Constant support; Fair  Ambulation/Gait Training:  Distance (ft): 200 Feet (ft)  Assistive Device: Gait belt;Walker, rolling  Ambulation - Level of Assistance: Stand-by assistance;Contact guard assistance        Gait Abnormalities: Decreased step clearance; Antalgic              Speed/Mary: Pace decreased (<100 feet/min)  Step Length: Left shortened;Right shortened                    Stairs:  Number of Stairs Trained: 8 (up/down 4 steps x2 reps)  Stairs - Level of Assistance: Contact guard assistance;Stand-by assistance   Rail Use: Left     Therapeutic Exercises:   L LE therex included: LAQs and marching; each completed for 1-2 sets and reps from 5-10 as tolerated    Pain Ratin/10 L knee; pt had received pain meds prior to session    Activity Tolerance:   Fair, SpO2 stable on RA, and requires rest breaks    After treatment patient left in no apparent distress:   Supine in bed and Call bell within reach    COMMUNICATION/COLLABORATION:   The patients plan of care was discussed with: Occupational therapist, Registered nurse, Case management, and Certified nursing assistant/patient care technician.      Pankaj Banda, PT   Time Calculation: 23 mins

## 2023-04-12 NOTE — PROGRESS NOTES
OCCUPATIONAL THERAPY EVALUATION/DISCHARGE  Patient: Sweetie Young (10 y.o. female)  Date: 4/12/2023  Primary Diagnosis: Primary osteoarthritis of left knee [M17.12]  Procedure(s) (LRB):  TOTAL LEFT KNEE REPLACEMENT (Left) 1 Day Post-Op   Precautions:   Bed Alarm, Fall, WBAT    ASSESSMENT  Based on the objective data described below, the patient presents with s/p L TKR yesterday. She lives with spouse and her sister will be helping at home for about a week. Very active lady, using [employer] prior to planned surgery. She has equipment needed for d/c. Was able to tolerate getting to EOB w/o physical assist, able to sit EOB for meal. Needed instruction for sit to stand as initially had 2 hands on walker which wobbled and was unsteady. Able to perform toilet transfer safely using grab bar and walker. Performed self care with set-up and supervision in standing. Instructed on how to dress surgical leg first and undress last.  Tolerated standing at sink for several minutes performing grooming tasks. Current Level of Function (ADLs/self-care): Recommend initial supervision at home for standing tasks but for just a few steps is mod independent to ambulate with walker. Might need set-up of clothing from closet at home for a few days until she is getting around more easily. Functional Outcome Measure: The patient scored 80/100 on the Barthel Index  outcome measure which is indicative of mild impairment in self care/ambulation/stairs. Other factors to consider for discharge: will go home with family     PLAN :  Recommend with staff: escort to bathroom    Recommendation for discharge: (in order for the patient to meet his/her long term goals)  No skilled occupational therapy/ follow up rehabilitation needs identified at this time.     This discharge recommendation:  Has not yet been discussed the attending provider and/or case management    IF patient discharges home will need the following DME: patient owns DME required for discharge       SUBJECTIVE:   Patient stated [place] are putting it on this morning.   Re: toilet riser    OBJECTIVE DATA SUMMARY:   HISTORY:   Past Medical History:   Diagnosis Date    Coronary atherosclerosis of native coronary artery 3/22/2011    Essential hypertension, benign 3/22/2011    GERD (gastroesophageal reflux disease)     Menopause     age 46    Mixed hyperlipidemia 3/22/2011    Reflux esophagitis 3/22/2011     Past Surgical History:   Procedure Laterality Date    HX BREAST BIOPSY Left     2002    HX COLONOSCOPY  2009    normal    HX HEART CATHETERIZATION  2003    RCA >90% stenosis    HX KNEE ARTHROSCOPY Left 08/2022    HX PARTIAL HYSTERECTOMY      age 39    HX PTCA  2003    stent to RCA. Hepacoat stent    STRESS TEST MYOVIEW  2006    no fixed or reversible deficits. Gated EF (%): 65.        Prior Level of Function/Environment/Context: independent ADL/IADL including yard work  Expanded or extensive additional review of patient history:   Home Situation  Home Environment: Private residence  # Steps to Enter: 4  Rails to Enter: Yes  Hand Rails : Left  Wheelchair Ramp: No  One/Two Story Residence: One story  Living Alone: No  Support Systems: Spouse/Significant Other  Patient Expects to be Discharged to[de-identified] Home with outpatient services  Current DME Used/Available at Home: Walker, rolling, Shower chair, Raised toilet seat  Tub or Shower Type: Shower    Hand dominance: Right    EXAMINATION OF PERFORMANCE DEFICITS:  Cognitive/Behavioral Status:  Neurologic State: Alert  Orientation Level: Oriented X4  Cognition: Follows commands     Perseveration: No perseveration noted       Skin: intact but OTR noticed on the R upper side of her back her R musculature is more visually prominent than the L. She tried twice to readjust her seated position but it persisted. Denies scoliosis. Not elevated in the shoulder. From approx mid scapula to spine. She will notify her MD for exam next visit.  No sxs.    Edema: being controlled with ice and elevation    Hearing: Auditory  Auditory Impairment: None    Vision/Perceptual:                                Corrective Lenses: Glasses    Range of Motion:    AROM: Within functional limits (bue)      Strength:    Strength: Within functional limits (bue)    Coordination:  Coordination: Within functional limits  Fine Motor Skills-Upper: Left Intact; Right Intact    Gross Motor Skills-Upper: Left Intact; Right Intact    Tone & Sensation:    Tone: Normal  Sensation: Intact    Balance:  Sitting: Intact  Standing: Impaired; With support  Standing - Static: Constant support;Good  Standing - Dynamic : Constant support; Fair    Functional Mobility and Transfers for ADLs:  Bed Mobility:  Rolling: Independent  Supine to Sit: Independent  Scooting: Independent    Transfers:  Sit to Stand: Modified independent  Stand to Sit: Modified independent  Bed to Chair: Modified independent  Bathroom Mobility: Supervision/set up  Toilet Transfer : Modified independent    ADL Assessment:  Feeding: Independent    Oral Facial Hygiene/Grooming: Independent    Bathing: Independent    Type of Bath: Chlorhexidine (CHG)    Upper Body Dressing: Setup    Lower Body Dressing: Setup    Toileting: Modified independent       ADL Intervention and task modifications:       Grooming  Position Performed: Standing  Washing Hands: Independent  Brushing Teeth: Modified independent  Brushing/Combing Hair: Independent    Upper Body Bathing  Bathing Assistance: Independent  Position Performed: Seated edge of bed    Type of Bath: Chlorhexidine (CHG)    Lower Body Bathing  Perineal  : Set-up  Position Performed: Seated on toilet  Lower Body : Independent  Position Performed: Seated edge of bed    Upper Body Dressing Assistance  Bra: Set-up  Pullover Shirt: Set-up    Lower Body Dressing Assistance  Underpants: Supervision    Toileting  Bladder Hygiene: Modified independent  Clothing Management: Modified independent  Adaptive Equipment: Grab bars; Walker         Functional Measure:    Barthel Index:  Bathin  Bladder: 10  Bowels: 10  Groomin  Dressin  Feeding: 10  Mobility: 10  Stairs: 0  Toilet Use: 10  Transfer (Bed to Chair and Back): 15  Total: 80/100      The Barthel ADL Index: Guidelines  1. The index should be used as a record of what a patient does, not as a record of what a patient could do. 2. The main aim is to establish degree of independence from any help, physical or verbal, however minor and for whatever reason. 3. The need for supervision renders the patient not independent. 4. A patient's performance should be established using the best available evidence. Asking the patient, friends/relatives and nurses are the usual sources, but direct observation and common sense are also important. However direct testing is not needed. 5. Usually the patient's performance over the preceding 24-48 hours is important, but occasionally longer periods will be relevant. 6. Middle categories imply that the patient supplies over 50 per cent of the effort. 7. Use of aids to be independent is allowed. Score Interpretation (from [address] [address])    Independent   60-79 Minimally independent   40-59 Partially dependent   20-39 Very dependent   <20 Totally dependent     -Lucero Pat., Barthel, D.W. (1965). Functional evaluation: the Barthel Index. [facility] ([address] (1997). The Barthel activities of daily living index: self-reporting versus actual performance in the old (> or = 75 years). Journal of [address] [place] [address] 45(7), [facility], JNAPOLEON, Derek Hairston.Shweta. (1999). Measuring the change in disability after inpatient rehabilitation; comparison of the responsiveness of the Barthel Index and Functional [place] Measure. Journal of Neurology, Neurosurgery, and Psychiatry, 66(4), 182-958.   JASVIR Roy, Hansel Burton, OSIEL, & Erika Hamilton M.A. (2004) Assessment of post-stroke quality of life in cost-effectiveness studies: The usefulness of the Barthel Index and the EuroQoL-5D. Quality of Life Research, 15, 313-36         Occupational Therapy Evaluation Charge Determination   History Examination Decision-Making   LOW Complexity : Brief history review  LOW Complexity : 1-3 performance deficits relating to physical, cognitive , or psychosocial skils that result in activity limitations and / or participation restrictions  LOW Complexity : No comorbidities that affect functional and no verbal or physical assistance needed to complete eval tasks       Based on the above components, the patient evaluation is determined to be of the following complexity level: LOW   Pain Rating:  Manageable with ice and pain meds    Activity Tolerance:   Good    After treatment patient left in no apparent distress:    Sitting in chair, Call bell within reach, and Bed / chair alarm activated    COMMUNICATION/EDUCATION:   The patients plan of care was discussed with: Registered nurse.      Thank you for this referral.  Dev Simeon OT  Time Calculation: 30 mins

## 2023-04-22 DIAGNOSIS — Z78.0 ASYMPTOMATIC MENOPAUSAL STATE: Primary | ICD-10-CM

## 2023-05-17 RX ORDER — ATORVASTATIN CALCIUM 40 MG/1
60 TABLET, FILM COATED ORAL DAILY
COMMUNITY

## 2023-05-17 RX ORDER — LOSARTAN POTASSIUM 25 MG/1
25 TABLET ORAL DAILY
COMMUNITY
Start: 2019-08-28

## 2023-05-17 RX ORDER — IBUPROFEN 800 MG/1
800 TABLET ORAL EVERY 6 HOURS PRN
COMMUNITY
Start: 2023-04-12

## 2023-05-17 RX ORDER — ACYCLOVIR 400 MG/1
400 TABLET ORAL 2 TIMES DAILY
COMMUNITY

## 2023-05-17 RX ORDER — EZETIMIBE 10 MG/1
TABLET ORAL
COMMUNITY

## 2023-05-17 RX ORDER — ASPIRIN 81 MG/1
TABLET ORAL DAILY
COMMUNITY

## 2023-06-01 ENCOUNTER — TRANSCRIBE ORDERS (OUTPATIENT)
Facility: HOSPITAL | Age: 68
End: 2023-06-01

## 2023-06-01 DIAGNOSIS — Z78.0 ASYMPTOMATIC MENOPAUSAL STATE: Primary | ICD-10-CM

## 2023-06-01 DIAGNOSIS — Z12.31 SCREENING MAMMOGRAM FOR BREAST CANCER: ICD-10-CM

## 2023-06-15 ENCOUNTER — HOSPITAL ENCOUNTER (OUTPATIENT)
Facility: HOSPITAL | Age: 68
Discharge: HOME | End: 2023-06-18
Attending: INTERNAL MEDICINE
Payer: MEDICARE

## 2023-06-15 DIAGNOSIS — Z78.0 ASYMPTOMATIC MENOPAUSAL STATE: ICD-10-CM

## 2023-06-15 DIAGNOSIS — Z12.31 SCREENING MAMMOGRAM FOR BREAST CANCER: ICD-10-CM

## 2023-06-15 PROCEDURE — 77067 SCR MAMMO BI INCL CAD: CPT

## 2023-06-15 PROCEDURE — 77080 DXA BONE DENSITY AXIAL: CPT

## 2024-07-12 DIAGNOSIS — Z12.31 SCREENING MAMMOGRAM FOR BREAST CANCER: Primary | ICD-10-CM

## 2024-07-18 ENCOUNTER — HOSPITAL ENCOUNTER (OUTPATIENT)
Facility: HOSPITAL | Age: 69
Discharge: HOME | End: 2024-07-18
Attending: INTERNAL MEDICINE
Payer: MEDICARE

## 2024-07-18 VITALS — WEIGHT: 212 LBS | BODY MASS INDEX: 33.2 KG/M2

## 2024-07-18 DIAGNOSIS — Z12.31 SCREENING MAMMOGRAM FOR BREAST CANCER: ICD-10-CM

## 2024-07-18 PROCEDURE — 77063 BREAST TOMOSYNTHESIS BI: CPT

## 2024-08-06 ENCOUNTER — HOSPITAL ENCOUNTER (OUTPATIENT)
Facility: HOSPITAL | Age: 69
Discharge: HOME | End: 2024-08-09
Payer: MEDICARE

## 2024-08-06 DIAGNOSIS — M25.552 LEFT HIP PAIN: ICD-10-CM

## 2024-08-06 PROCEDURE — 73502 X-RAY EXAM HIP UNI 2-3 VIEWS: CPT

## 2025-02-07 ENCOUNTER — TRANSCRIBE ORDERS (OUTPATIENT)
Facility: HOSPITAL | Age: 70
End: 2025-02-07

## 2025-02-07 DIAGNOSIS — Z12.31 SCREENING MAMMOGRAM FOR BREAST CANCER: ICD-10-CM

## 2025-02-07 DIAGNOSIS — Z78.0 ASYMPTOMATIC MENOPAUSAL STATE: Primary | ICD-10-CM

## 2025-04-22 ENCOUNTER — OFFICE VISIT (OUTPATIENT)
Age: 70
End: 2025-04-22
Payer: MEDICARE

## 2025-04-22 VITALS
TEMPERATURE: 98.2 F | BODY MASS INDEX: 32.33 KG/M2 | WEIGHT: 206 LBS | DIASTOLIC BLOOD PRESSURE: 88 MMHG | SYSTOLIC BLOOD PRESSURE: 165 MMHG | HEIGHT: 67 IN | OXYGEN SATURATION: 97 % | RESPIRATION RATE: 20 BRPM | HEART RATE: 77 BPM

## 2025-04-22 DIAGNOSIS — R19.4 CHANGE IN BOWEL HABITS: Primary | ICD-10-CM

## 2025-04-22 DIAGNOSIS — R19.5 POSITIVE COLORECTAL CANCER SCREENING USING COLOGUARD TEST: ICD-10-CM

## 2025-04-22 PROCEDURE — 1123F ACP DISCUSS/DSCN MKR DOCD: CPT | Performed by: SURGERY

## 2025-04-22 PROCEDURE — 1159F MED LIST DOCD IN RCRD: CPT | Performed by: SURGERY

## 2025-04-22 PROCEDURE — G8417 CALC BMI ABV UP PARAM F/U: HCPCS | Performed by: SURGERY

## 2025-04-22 PROCEDURE — 1036F TOBACCO NON-USER: CPT | Performed by: SURGERY

## 2025-04-22 PROCEDURE — 3077F SYST BP >= 140 MM HG: CPT | Performed by: SURGERY

## 2025-04-22 PROCEDURE — 3017F COLORECTAL CA SCREEN DOC REV: CPT | Performed by: SURGERY

## 2025-04-22 PROCEDURE — 3079F DIAST BP 80-89 MM HG: CPT | Performed by: SURGERY

## 2025-04-22 PROCEDURE — G8399 PT W/DXA RESULTS DOCUMENT: HCPCS | Performed by: SURGERY

## 2025-04-22 PROCEDURE — 1090F PRES/ABSN URINE INCON ASSESS: CPT | Performed by: SURGERY

## 2025-04-22 PROCEDURE — G8427 DOCREV CUR MEDS BY ELIG CLIN: HCPCS | Performed by: SURGERY

## 2025-04-22 PROCEDURE — 99203 OFFICE O/P NEW LOW 30 MIN: CPT | Performed by: SURGERY

## 2025-04-22 PROCEDURE — 1160F RVW MEDS BY RX/DR IN RCRD: CPT | Performed by: SURGERY

## 2025-04-22 PROCEDURE — 1126F AMNT PAIN NOTED NONE PRSNT: CPT | Performed by: SURGERY

## 2025-04-22 ASSESSMENT — PATIENT HEALTH QUESTIONNAIRE - PHQ9
1. LITTLE INTEREST OR PLEASURE IN DOING THINGS: NOT AT ALL
SUM OF ALL RESPONSES TO PHQ QUESTIONS 1-9: 0
2. FEELING DOWN, DEPRESSED OR HOPELESS: NOT AT ALL
SUM OF ALL RESPONSES TO PHQ QUESTIONS 1-9: 0

## 2025-04-22 NOTE — PROGRESS NOTES
Identified pt with two pt identifiers (name and ). Reviewed chart in preparation for visit and have obtained necessary documentation.    Marilee Stevenson is a 69 y.o. female New Patient and Colonoscopy  .    Vitals:    25 1507 25 1508   BP: (!) 158/96 (!) 165/88   BP Site: Right Upper Arm    Patient Position: Sitting    BP Cuff Size: Medium Adult    Pulse: 77    Resp: 20    Temp: 98.2 °F (36.8 °C)    TempSrc: Oral    SpO2: 97%    Weight: 93.4 kg (206 lb)    Height: 1.702 m (5' 7\")           1. Have you been to the ER, urgent care clinic since your last visit?  Hospitalized since your last visit?  no     2. Have you seen or consulted any other health care providers outside of the [facility] since your last visit?  Include any pap smears or colon screening.  yes - [facility]    BP elevated. Patient advised to  follow up with PCP and check BP twice a day at home.

## 2025-04-23 ENCOUNTER — TELEPHONE (OUTPATIENT)
Age: 70
End: 2025-04-23

## 2025-04-23 ENCOUNTER — PREP FOR PROCEDURE (OUTPATIENT)
Age: 70
End: 2025-04-23

## 2025-04-23 DIAGNOSIS — R19.4 CHANGE IN BOWEL HABITS: ICD-10-CM

## 2025-04-23 DIAGNOSIS — R19.5 POSITIVE COLORECTAL CANCER SCREENING USING COLOGUARD TEST: ICD-10-CM

## 2025-04-23 RX ORDER — M-VIT,TX,IRON,MINS/CALC/FOLIC 27MG-0.4MG
1 TABLET ORAL DAILY
COMMUNITY

## 2025-04-23 RX ORDER — AMOXICILLIN 500 MG
1200 CAPSULE ORAL DAILY
COMMUNITY

## 2025-04-23 NOTE — TELEPHONE ENCOUNTER
Left Voicemail for patient to return call.     Need to provide patient with surgery date, post-op date and time. Need to provide surgical prep and inform them that the OR will be calling them at least 3 days prior to procedure to review surgery details including arrival time and review detailed surgery prep and medication details and instructions. .    SURGERY: WEDNESDAY, 2025    POST-OP: MONDAY , 2025 @ 4:15 PM     Patient returned call.  Verified patient name and . Provided patient with surgery date, post-op date and time.  Patient made aware that the OR will be calling them to review surgery details including arrival time and review detailed surgery prep and medication details and instructions. Reviewed surgery prep detail and confirmed patient's understanding.  Questions and concerns addressed at this time.    SURGERY: 2025    POST-OP: 2025 @ 4:15

## 2025-04-23 NOTE — TELEPHONE ENCOUNTER
Patient called and wanted to know if she should stop taking Benefiber before her procedure. I spoke with Dr. Sarah and he would like for her to stop the benefiber 1 week prior to surgery.    I called patient back and informed her to stop it 1 week before procedure.

## 2025-05-06 ENCOUNTER — TRANSCRIBE ORDERS (OUTPATIENT)
Facility: HOSPITAL | Age: 70
End: 2025-05-06

## 2025-05-06 ENCOUNTER — HOSPITAL ENCOUNTER (OUTPATIENT)
Facility: HOSPITAL | Age: 70
Discharge: HOME | End: 2025-05-09
Payer: MEDICARE

## 2025-05-06 DIAGNOSIS — M25.511 RIGHT SHOULDER PAIN, UNSPECIFIED CHRONICITY: Primary | ICD-10-CM

## 2025-05-06 DIAGNOSIS — M25.511 RIGHT SHOULDER PAIN, UNSPECIFIED CHRONICITY: ICD-10-CM

## 2025-05-06 PROCEDURE — 73030 X-RAY EXAM OF SHOULDER: CPT

## 2025-05-15 ENCOUNTER — TELEPHONE (OUTPATIENT)
Age: 70
End: 2025-05-15

## 2025-05-15 NOTE — TELEPHONE ENCOUNTER
Patient called informed me that she was started on a new medication (Meloxicam) and wanted to know if that medication will interfere with her up coming colonoscopy.      After discussion with Dr. Sarah, I called patient back to tell her that she must stop the Meloxicam 7 day prior to her scheduled colonoscopy

## 2025-05-19 ENCOUNTER — ANESTHESIA EVENT (OUTPATIENT)
Facility: HOSPITAL | Age: 70
End: 2025-05-19
Payer: MEDICARE

## 2025-05-19 NOTE — ANESTHESIA PRE PROCEDURE
Applicable):  No results found for: \"ABORH\", \"LABANTI\"    Drug/Infectious Status (If Applicable):  No results found for: \"HIV\", \"HEPCAB\"    COVID-19 Screening (If Applicable): No results found for: \"COVID19\"        Anesthesia Evaluation  Patient summary reviewed and Nursing notes reviewed  Airway: Mallampati: III  TM distance: <3 FB   Neck ROM: full  Mouth opening: > = 3 FB   Dental: normal exam         Pulmonary:Negative Pulmonary ROS and normal exam  breath sounds clear to auscultation                             Cardiovascular:    (+) hypertension:, CAD:, CABG/stent:, hyperlipidemia        Rhythm: regular  Rate: normal                    Neuro/Psych:   Negative Neuro/Psych ROS              GI/Hepatic/Renal:   (+) GERD:, bowel prep         ROS comment: obesity.   Endo/Other: Negative Endo/Other ROS                    Abdominal:   (+) obese          Vascular: negative vascular ROS.         Other Findings:         Anesthesia Plan      MAC     ASA 3       Induction: intravenous.      Anesthetic plan and risks discussed with patient.                      Constance Weber MD   5/19/2025

## 2025-06-04 NOTE — PERIOP NOTE
[facility]  SURGICAL PRE-ADMISSION INSTRUCTIONS    ARRIVAL  You will be called the day before your surgery with your expected arrival time.  Sign in at the  of the hospital.  You will be directed to the Surgical Waiting Room.  Please arrive at your scheduled appointment time.  You have been scheduled to arrive for your procedure one or two hours prior to the expected start time of your procedure.  Every effort will be made to minimize your wait but please be aware that unforeseen circumstances may affect our schedule.    EATING  DO NOT EAT OR DRINK ANYTHING AFTER MIDNIGHT ON THE EVENING BEFORE YOUR SURGERY OR ON THE DAY OF YOUR SURGERY except for your medications (as instructed) with a sip of water.  Do not use gum, mints or lozenges on the morning of your surgery.  Please do not smoke or chew tobacco before your surgery.    MEDICATIONS   none    STOP THESE MEDICATIONS AT THE TIMES LISTED BELOW  Aspirin ;  7 days before                                                NSAIDS (Indocin, Ibuprofen, Naprosyn) ;  7 days before   Fish oil  DRIVING/TRANSPORATION  Have a responsible adult to drive you home from the hospital and to stay with you over night.  Please have them plan to remain in the hospital during your surgery.  Your surgery will not be done if you do not have a responsible adult to take you home and to stay with you.  If you have arranged for public transport, you must have a responsible adult to ride with you (who is not the ).  You may not drive for 24 hours after anesthesia.    PREPARATION  If you have a Living WiIl/Advance Directive, please bring a copy with you to scan into your chart.   Please DO NOT wear makeup or nail polish  Please leave valuables at home,  DO NOT wear jewelry.  Wear loose, comfortable clothing that is large enough to cover a bulky dressing.    SPECIAL INSTRUCTIONS:  Follow your surgeon's instructions for preoperative bowel

## 2025-06-10 RX ORDER — SODIUM CHLORIDE 9 MG/ML
INJECTION, SOLUTION INTRAVENOUS PRN
Status: CANCELLED | OUTPATIENT
Start: 2025-06-10

## 2025-06-10 RX ORDER — SODIUM CHLORIDE 0.9 % (FLUSH) 0.9 %
5-40 SYRINGE (ML) INJECTION EVERY 12 HOURS SCHEDULED
Status: CANCELLED | OUTPATIENT
Start: 2025-06-10

## 2025-06-10 RX ORDER — SODIUM CHLORIDE, SODIUM LACTATE, POTASSIUM CHLORIDE, CALCIUM CHLORIDE 600; 310; 30; 20 MG/100ML; MG/100ML; MG/100ML; MG/100ML
INJECTION, SOLUTION INTRAVENOUS CONTINUOUS
Status: CANCELLED | OUTPATIENT
Start: 2025-06-10

## 2025-06-10 RX ORDER — SODIUM CHLORIDE 0.9 % (FLUSH) 0.9 %
5-40 SYRINGE (ML) INJECTION PRN
Status: CANCELLED | OUTPATIENT
Start: 2025-06-10

## 2025-06-10 NOTE — ASSESSMENT & PLAN NOTE
Recommend colonoscopy.  The procedure was explained in detail including the risks and benefits.  Risks shared included risks of missed lesions, incomplete exam, colon injury or perforation.   Risks associated with anesthesia were also discussed.  The patient wishes to proceed and we will schedule.

## 2025-06-10 NOTE — H&P
HPI    Marilee Stevenson is a 69 y.o. female. She is a patient of Alexia Prasad MD, and Izabella Powell who presents for a colonoscopy. She denies any history of melena or hematochezia or bright red blood per rectum.  She has had a change in her bowel habits over the last year.  She will have periods of time where she gets sudden urgency to have a bowel movement and it may be followed by diarrheal stools and then will resolve.   She also denies any abdominal pain or unexpected weight loss.  She has had no change in the caliber of her  stool.  She has no family history of colon cancer.  She has had a positive Cologuard on April 1.  Her last colonoscopy was in 2009 and apparently was normal.  She is also had a Cologuard test at some point between her colonoscopy and now which was negative.    She has had a partial hysterectomy for fibroids.  She has had a left total knee replacement.    She does have a history of coronary artery disease and had stenting but never had an MI.  She has reflux that is well-controlled and hypertension.    She is on an 81 mg aspirin and fish oil.    Past Medical History:   Diagnosis Date    Coronary atherosclerosis of native coronary artery 3/22/2011    Essential hypertension, benign 3/22/2011    GERD (gastroesophageal reflux disease)     Menopause     age 51    Mixed hyperlipidemia 3/22/2011    Reflux esophagitis 3/22/2011       Past Surgical History:   Procedure Laterality Date    BREAST BIOPSY Left     2002    CARDIAC CATHETERIZATION  2003    RCA >90% stenosis    COLONOSCOPY  2009    normal    KNEE ARTHROSCOPY Left 08/2022    PARTIAL HYSTERECTOMY (CERVIX NOT REMOVED)      age 41    PTCA  2003    stent to RCA. Hepacoat stent    STRESS TEST MYOVIEW  2006    no fixed or reversible deficits. Gated EF (%): 65.        Social History     Socioeconomic History    Marital status:      Spouse name: Not on file    Number of children: Not on file    Years of education: Not on file

## 2025-06-11 ENCOUNTER — ANESTHESIA (OUTPATIENT)
Facility: HOSPITAL | Age: 70
End: 2025-06-11
Payer: MEDICARE

## 2025-06-11 ENCOUNTER — HOSPITAL ENCOUNTER (OUTPATIENT)
Facility: HOSPITAL | Age: 70
Setting detail: OUTPATIENT SURGERY
Discharge: HOME | End: 2025-06-11
Attending: SURGERY | Admitting: SURGERY
Payer: MEDICARE

## 2025-06-11 VITALS
HEART RATE: 68 BPM | SYSTOLIC BLOOD PRESSURE: 141 MMHG | OXYGEN SATURATION: 100 % | BODY MASS INDEX: 32.33 KG/M2 | HEIGHT: 67 IN | TEMPERATURE: 97.8 F | DIASTOLIC BLOOD PRESSURE: 58 MMHG | WEIGHT: 206 LBS | RESPIRATION RATE: 15 BRPM

## 2025-06-11 PROCEDURE — 7100000011 HC PHASE II RECOVERY - ADDTL 15 MIN: Performed by: SURGERY

## 2025-06-11 PROCEDURE — 3600000012 HC SURGERY LEVEL 2 ADDTL 15MIN: Performed by: SURGERY

## 2025-06-11 PROCEDURE — 3600000002 HC SURGERY LEVEL 2 BASE: Performed by: SURGERY

## 2025-06-11 PROCEDURE — 2580000003 HC RX 258: Performed by: SURGERY

## 2025-06-11 PROCEDURE — 3700000000 HC ANESTHESIA ATTENDED CARE: Performed by: SURGERY

## 2025-06-11 PROCEDURE — 7100000010 HC PHASE II RECOVERY - FIRST 15 MIN: Performed by: SURGERY

## 2025-06-11 PROCEDURE — 3700000001 HC ADD 15 MINUTES (ANESTHESIA): Performed by: SURGERY

## 2025-06-11 PROCEDURE — 6360000002 HC RX W HCPCS: Performed by: ANESTHESIOLOGY

## 2025-06-11 PROCEDURE — 2709999900 HC NON-CHARGEABLE SUPPLY: Performed by: SURGERY

## 2025-06-11 PROCEDURE — 88305 TISSUE EXAM BY PATHOLOGIST: CPT

## 2025-06-11 PROCEDURE — 2500000003 HC RX 250 WO HCPCS: Performed by: ANESTHESIOLOGY

## 2025-06-11 RX ORDER — METOCLOPRAMIDE HYDROCHLORIDE 5 MG/ML
10 INJECTION INTRAMUSCULAR; INTRAVENOUS
Status: CANCELLED | OUTPATIENT
Start: 2025-06-11

## 2025-06-11 RX ORDER — GLYCOPYRROLATE 0.2 MG/ML
INJECTION INTRAMUSCULAR; INTRAVENOUS
Status: DISCONTINUED | OUTPATIENT
Start: 2025-06-11 | End: 2025-06-11 | Stop reason: SDUPTHER

## 2025-06-11 RX ORDER — SODIUM CHLORIDE 0.9 % (FLUSH) 0.9 %
5-40 SYRINGE (ML) INJECTION PRN
Status: DISCONTINUED | OUTPATIENT
Start: 2025-06-11 | End: 2025-06-11 | Stop reason: HOSPADM

## 2025-06-11 RX ORDER — SODIUM CHLORIDE 0.9 % (FLUSH) 0.9 %
5-40 SYRINGE (ML) INJECTION PRN
Status: CANCELLED | OUTPATIENT
Start: 2025-06-11

## 2025-06-11 RX ORDER — NALOXONE HYDROCHLORIDE 0.4 MG/ML
INJECTION, SOLUTION INTRAMUSCULAR; INTRAVENOUS; SUBCUTANEOUS PRN
Status: CANCELLED | OUTPATIENT
Start: 2025-06-11

## 2025-06-11 RX ORDER — SODIUM CHLORIDE 9 MG/ML
INJECTION, SOLUTION INTRAVENOUS PRN
Status: CANCELLED | OUTPATIENT
Start: 2025-06-11

## 2025-06-11 RX ORDER — OMEPRAZOLE 20 MG/1
20 TABLET, DELAYED RELEASE ORAL DAILY
COMMUNITY

## 2025-06-11 RX ORDER — PROPOFOL 10 MG/ML
INJECTION, EMULSION INTRAVENOUS
Status: DISCONTINUED | OUTPATIENT
Start: 2025-06-11 | End: 2025-06-11 | Stop reason: SDUPTHER

## 2025-06-11 RX ORDER — SODIUM CHLORIDE 9 MG/ML
INJECTION, SOLUTION INTRAVENOUS PRN
Status: DISCONTINUED | OUTPATIENT
Start: 2025-06-11 | End: 2025-06-11 | Stop reason: HOSPADM

## 2025-06-11 RX ORDER — LIDOCAINE HYDROCHLORIDE 20 MG/ML
INJECTION, SOLUTION EPIDURAL; INFILTRATION; INTRACAUDAL; PERINEURAL
Status: DISCONTINUED | OUTPATIENT
Start: 2025-06-11 | End: 2025-06-11 | Stop reason: SDUPTHER

## 2025-06-11 RX ORDER — DEXMEDETOMIDINE HYDROCHLORIDE 100 UG/ML
INJECTION, SOLUTION INTRAVENOUS
Status: DISCONTINUED | OUTPATIENT
Start: 2025-06-11 | End: 2025-06-11 | Stop reason: SDUPTHER

## 2025-06-11 RX ORDER — SODIUM CHLORIDE 0.9 % (FLUSH) 0.9 %
5-40 SYRINGE (ML) INJECTION EVERY 12 HOURS SCHEDULED
Status: CANCELLED | OUTPATIENT
Start: 2025-06-11

## 2025-06-11 RX ORDER — DIPHENHYDRAMINE HYDROCHLORIDE 50 MG/ML
12.5 INJECTION, SOLUTION INTRAMUSCULAR; INTRAVENOUS
Status: CANCELLED | OUTPATIENT
Start: 2025-06-11

## 2025-06-11 RX ORDER — SODIUM CHLORIDE 0.9 % (FLUSH) 0.9 %
5-40 SYRINGE (ML) INJECTION EVERY 12 HOURS SCHEDULED
Status: DISCONTINUED | OUTPATIENT
Start: 2025-06-11 | End: 2025-06-11 | Stop reason: HOSPADM

## 2025-06-11 RX ORDER — SODIUM CHLORIDE, SODIUM LACTATE, POTASSIUM CHLORIDE, CALCIUM CHLORIDE 600; 310; 30; 20 MG/100ML; MG/100ML; MG/100ML; MG/100ML
INJECTION, SOLUTION INTRAVENOUS CONTINUOUS
Status: DISCONTINUED | OUTPATIENT
Start: 2025-06-11 | End: 2025-06-11 | Stop reason: HOSPADM

## 2025-06-11 RX ORDER — LABETALOL HYDROCHLORIDE 5 MG/ML
10 INJECTION, SOLUTION INTRAVENOUS
Status: CANCELLED | OUTPATIENT
Start: 2025-06-11

## 2025-06-11 RX ORDER — HYDRALAZINE HYDROCHLORIDE 20 MG/ML
10 INJECTION INTRAMUSCULAR; INTRAVENOUS
Status: CANCELLED | OUTPATIENT
Start: 2025-06-11

## 2025-06-11 RX ORDER — DEXAMETHASONE SODIUM PHOSPHATE 4 MG/ML
4 INJECTION, SOLUTION INTRA-ARTICULAR; INTRALESIONAL; INTRAMUSCULAR; INTRAVENOUS; SOFT TISSUE
Status: CANCELLED | OUTPATIENT
Start: 2025-06-11

## 2025-06-11 RX ADMIN — PROPOFOL 30 MG: 10 INJECTION, EMULSION INTRAVENOUS at 09:20

## 2025-06-11 RX ADMIN — PROPOFOL 30 MG: 10 INJECTION, EMULSION INTRAVENOUS at 09:17

## 2025-06-11 RX ADMIN — GLYCOPYRROLATE 0.2 MG: 0.2 INJECTION INTRAMUSCULAR; INTRAVENOUS at 09:11

## 2025-06-11 RX ADMIN — LIDOCAINE HYDROCHLORIDE 50 MG: 20 INJECTION, SOLUTION EPIDURAL; INFILTRATION; INTRACAUDAL; PERINEURAL at 09:11

## 2025-06-11 RX ADMIN — DEXMEDETOMIDINE 5 MCG: 100 INJECTION, SOLUTION INTRAVENOUS at 09:11

## 2025-06-11 RX ADMIN — PROPOFOL 25 MG: 10 INJECTION, EMULSION INTRAVENOUS at 09:28

## 2025-06-11 RX ADMIN — PROPOFOL 80 MG: 10 INJECTION, EMULSION INTRAVENOUS at 09:15

## 2025-06-11 RX ADMIN — PROPOFOL 30 MG: 10 INJECTION, EMULSION INTRAVENOUS at 09:25

## 2025-06-11 RX ADMIN — PROPOFOL 25 MG: 10 INJECTION, EMULSION INTRAVENOUS at 09:32

## 2025-06-11 RX ADMIN — PROPOFOL 50 MG: 10 INJECTION, EMULSION INTRAVENOUS at 09:21

## 2025-06-11 RX ADMIN — PROPOFOL 30 MG: 10 INJECTION, EMULSION INTRAVENOUS at 09:24

## 2025-06-11 RX ADMIN — SODIUM CHLORIDE, SODIUM LACTATE, POTASSIUM CHLORIDE, AND CALCIUM CHLORIDE: .6; .31; .03; .02 INJECTION, SOLUTION INTRAVENOUS at 08:31

## 2025-06-11 RX ADMIN — PROPOFOL 25 MG: 10 INJECTION, EMULSION INTRAVENOUS at 09:37

## 2025-06-11 ASSESSMENT — PAIN - FUNCTIONAL ASSESSMENT: PAIN_FUNCTIONAL_ASSESSMENT: NONE - DENIES PAIN

## 2025-06-11 NOTE — PERIOP NOTE
Patient meets discharge criteria per PACU GM Valencia. IV removed. All belongings gathered. Discharge instructions reviewed with patient and discharge caregiver. There was a time for questions and concerns.

## 2025-06-11 NOTE — OP NOTE
disease as was the descending colon.  The sigmoid colon showed diverticulosis.  The scope was pulled back into the rectum and retroflexed, which revealed no significant lesions.  The scope was then straightened out and carefully withdrawn.    She appeared to tolerate the procedure well and was transferred to PACU in stable condition.    DRAINS:  None.    DISPOSITION:  Stable.        MD CRISTINA REYNOSO/ANNAMARIE  D:  06/11/2025 09:44:57  T:  06/11/2025 10:14:06  JOB #:  [id number]    CC:   TADEO French MD

## 2025-06-11 NOTE — INTERVAL H&P NOTE
Update History & Physical    The patient's History and Physical of Alexia 10, 2025 was reviewed with the patient and I examined the patient. There was no change. The surgical site was confirmed by the patient and me.     Plan: The risks, benefits, expected outcome, and alternative to the recommended procedure have been discussed with the patient. Patient understands and wants to proceed with the procedure.     Electronically signed by Richelle Sarah MD on 6/11/2025 at 9:09 AM

## 2025-06-11 NOTE — BRIEF OP NOTE
Brief Postoperative Note      Patient: Marilee Stevenson  YOB: 1955  MRN: [id number]    Date of Procedure: 6/11/2025    Pre-Op Diagnosis Codes:      * Change in bowel habits [R19.4]     * Positive colorectal cancer screening using Cologuard test [R19.5]    Post-Op Diagnosis: Same       Procedure(s):  COLONOSCOPY WITH HOT SNARE POLYPECTOMY    Surgeon(s):  Richelle Sarah MD    Assistant:  * No surgical staff found *    Anesthesia: Monitor Anesthesia Care    Estimated Blood Loss (mL): Minimal    Complications: None    Specimens:   ID Type Source Tests Collected by Time Destination   1 : Ascending colon polyp Tissue Colon SURGICAL PATHOLOGY Richelle Sarah MD 6/11/2025 0932        Implants:  * No implants in log *      Drains: * No LDAs found *    Findings:  Infection Present At Time Of Surgery (PATOS) (choose all levels that have infection present):  No infection present  Other Findings:   1.  Sessile polyp of the ascending colon removed by hot snare  2.  Sigmoid diverticulosis    Electronically signed by iRchelle Sarah MD on 6/11/2025 at 9:41 AM

## 2025-06-11 NOTE — ANESTHESIA POSTPROCEDURE EVALUATION
Department of Anesthesiology  Postprocedure Note    Patient: Marilee Stevenson  MRN: [id number]  YOB: 1955  Date of evaluation: 6/11/2025    Procedure Summary       Date: 06/11/25 Room / Location: [facility] MAIN OR [address] [place] [address] MAIN OR    Anesthesia Start: 0911 Anesthesia Stop: 0942    Procedure: COLONOSCOPY WITH HOT SNARE POLYPECTOMY (Lower GI Region) Diagnosis:       Change in bowel habits      Positive colorectal cancer screening using Cologuard test      (Change in bowel habits [R19.4])      (Positive colorectal cancer screening using Cologuard test [R19.5])    Surgeons: Richelle Sarah MD Responsible Provider: Neftali Diaz MD    Anesthesia Type: MAC ASA Status: 3            Anesthesia Type: No value filed.    Serene Phase I:      Serene Phase II:      Anesthesia Post Evaluation    Patient location during evaluation: PACU  Patient participation: complete - patient participated  Level of consciousness: awake  Pain score: 0  Airway patency: patent  Nausea & Vomiting: no nausea and no vomiting  Cardiovascular status: hemodynamically stable  Respiratory status: acceptable and room air  Hydration status: euvolemic  Pain management: adequate    No notable events documented.

## 2025-06-23 ENCOUNTER — OFFICE VISIT (OUTPATIENT)
Age: 70
End: 2025-06-23
Payer: MEDICARE

## 2025-06-23 VITALS
BODY MASS INDEX: 31.39 KG/M2 | RESPIRATION RATE: 12 BRPM | HEART RATE: 77 BPM | HEIGHT: 67 IN | DIASTOLIC BLOOD PRESSURE: 85 MMHG | WEIGHT: 200 LBS | TEMPERATURE: 98.4 F | OXYGEN SATURATION: 96 % | SYSTOLIC BLOOD PRESSURE: 131 MMHG

## 2025-06-23 DIAGNOSIS — Z86.0101 PERSONAL HISTORY OF ADENOMATOUS AND SERRATED COLON POLYPS: Primary | ICD-10-CM

## 2025-06-23 DIAGNOSIS — K57.90 DIVERTICULOSIS: ICD-10-CM

## 2025-06-23 PROCEDURE — 99213 OFFICE O/P EST LOW 20 MIN: CPT | Performed by: SURGERY

## 2025-06-23 PROCEDURE — 3017F COLORECTAL CA SCREEN DOC REV: CPT | Performed by: SURGERY

## 2025-06-23 PROCEDURE — 1126F AMNT PAIN NOTED NONE PRSNT: CPT | Performed by: SURGERY

## 2025-06-23 PROCEDURE — 1036F TOBACCO NON-USER: CPT | Performed by: SURGERY

## 2025-06-23 PROCEDURE — G8417 CALC BMI ABV UP PARAM F/U: HCPCS | Performed by: SURGERY

## 2025-06-23 PROCEDURE — G8427 DOCREV CUR MEDS BY ELIG CLIN: HCPCS | Performed by: SURGERY

## 2025-06-23 PROCEDURE — 3075F SYST BP GE 130 - 139MM HG: CPT | Performed by: SURGERY

## 2025-06-23 PROCEDURE — 1123F ACP DISCUSS/DSCN MKR DOCD: CPT | Performed by: SURGERY

## 2025-06-23 PROCEDURE — 3079F DIAST BP 80-89 MM HG: CPT | Performed by: SURGERY

## 2025-06-23 PROCEDURE — G8399 PT W/DXA RESULTS DOCUMENT: HCPCS | Performed by: SURGERY

## 2025-06-23 PROCEDURE — 1090F PRES/ABSN URINE INCON ASSESS: CPT | Performed by: SURGERY

## 2025-06-23 PROCEDURE — 1159F MED LIST DOCD IN RCRD: CPT | Performed by: SURGERY

## 2025-06-23 ASSESSMENT — PATIENT HEALTH QUESTIONNAIRE - PHQ9
SUM OF ALL RESPONSES TO PHQ QUESTIONS 1-9: 0
SUM OF ALL RESPONSES TO PHQ QUESTIONS 1-9: 0
1. LITTLE INTEREST OR PLEASURE IN DOING THINGS: NOT AT ALL
SUM OF ALL RESPONSES TO PHQ QUESTIONS 1-9: 0
SUM OF ALL RESPONSES TO PHQ QUESTIONS 1-9: 0
2. FEELING DOWN, DEPRESSED OR HOPELESS: NOT AT ALL

## 2025-06-23 NOTE — PATIENT INSTRUCTIONS
High-Fiber Diet: Care Instructions  Overview     A high-fiber diet may help you relieve constipation and feel less bloated.  Your doctor and dietitian will help you make a high-fiber eating plan based on your personal needs. The plan will include the things you like to eat. It will also make sure that you get 25 to 35 grams of fiber a day.  Before you make changes to the way you eat, be sure to talk with your doctor or dietitian.  Follow-up care is a key part of your treatment and safety. Be sure to make and go to all appointments, and call your doctor if you are having problems. It's also a good idea to know your test results and keep a list of the medicines you take.  How can you care for yourself at home?  You can increase how much fiber you get if you eat more of certain foods. These foods include:  Whole-grain breads and cereals.  Fruits, such as pears, apples, and peaches. Eat the skins and peels if you can.  Vegetables, such as broccoli, cabbage, spinach, carrots, asparagus, and squash.  Starchy vegetables. These include potatoes with skins, kidney beans, and lima beans.  Take a fiber supplement every day if your doctor recommends it. Examples are Benefiber, Citrucel, FiberCon, and Metamucil. Ask your doctor how much to take.  Drink plenty of fluids. If you have kidney, heart, or liver disease and have to limit fluids, talk with your doctor before you increase the amount of fluids you drink.  Where can you learn more?  Go to https://www.[employer].net/patientEd and enter U654 to learn more about \"High-Fiber Diet: Care Instructions.\"  Current as of: May 9, 2022               Content Version: 13.6  © 9629-3957 [employer].   Care instructions adapted under license by [employer]. If you have questions about a medical condition or this instruction, always ask your healthcare professional. [employer] disclaims any warranty or liability for your use of this information.

## 2025-06-23 NOTE — PROGRESS NOTES
Marilee Stevenson is a 69 y.o. female who presents today with the following:  Chief Complaint   Patient presents with    Post-Op Check     Colonoscopy result       HPI    Ms. Stevenson presents in follow-up after her colonoscopy was performed back on .  She has done well since the procedure.  She was found to have a sessile tubular adenoma of the proximal ascending colon.  She also was found to have diverticulosis.  She has had no new problems from a colon standpoint since the procedure.  The polyp was just over a centimeter in size.  She already eats a high-fiber diet.    Past Medical History:   Diagnosis Date    Coronary atherosclerosis of native coronary artery 3/22/2011    Essential hypertension, benign 3/22/2011    GERD (gastroesophageal reflux disease)     Menopause     age 51    Mixed hyperlipidemia 3/22/2011    Reflux esophagitis 3/22/2011       Past Surgical History:   Procedure Laterality Date    BREAST BIOPSY Left         CARDIAC CATHETERIZATION      RCA >90% stenosis    COLONOSCOPY      normal    COLONOSCOPY N/A 2025    COLONOSCOPY WITH HOT SNARE POLYPECTOMY performed by Richelle Sarah MD at [facility] MAIN OR    KNEE ARTHROSCOPY Left 2022    PARTIAL HYSTERECTOMY (CERVIX NOT REMOVED)      age 41    PTCA  2003    stent to RCA. Hepacoat stent    STRESS TEST MYOVIEW  2006    no fixed or reversible deficits. Gated EF (%): 65.        Social History     Socioeconomic History    Marital status:      Spouse name: Not on file    Number of children: Not on file    Years of education: Not on file    Highest education level: Not on file   Occupational History    Not on file   Tobacco Use    Smoking status: Former     Current packs/day: 0.00     Average packs/day: 0.5 packs/day for 13.4 years (6.7 ttl pk-yrs)     Types: Cigarettes     Start date:      Quit date: 1985     Years since quittin.0    Smokeless tobacco: Never   Substance and Sexual Activity    Alcohol use: No    Drug

## 2025-06-23 NOTE — PROGRESS NOTES
Identified pt with two pt identifiers (name and ). Reviewed chart in preparation for visit and have obtained necessary documentation.    Marilee Stevenson is a 69 y.o. female Post-Op Check (Colonoscopy result)  .    Vitals:    25 1533   BP: 131/85   BP Site: Right Upper Arm   Patient Position: Sitting   BP Cuff Size: Medium Adult   Pulse: 77   Resp: 12   Temp: 98.4 °F (36.9 °C)   TempSrc: Oral   SpO2: 96%   Weight: 90.7 kg (200 lb)   Height: 1.702 m (5' 7\")          1. Have you been to the ER, urgent care clinic since your last visit?  Hospitalized since your last visit?  no     2. Have you seen or consulted any other health care providers outside of the [facility] since your last visit?  Include any pap smears or colon screening.  yes - [place] interist

## 2025-07-21 ENCOUNTER — HOSPITAL ENCOUNTER (OUTPATIENT)
Facility: HOSPITAL | Age: 70
Discharge: HOME | End: 2025-07-24
Payer: MEDICARE

## 2025-07-21 DIAGNOSIS — Z78.0 ASYMPTOMATIC MENOPAUSAL STATE: ICD-10-CM

## 2025-07-21 DIAGNOSIS — Z12.31 SCREENING MAMMOGRAM FOR BREAST CANCER: ICD-10-CM

## 2025-07-21 PROCEDURE — 77080 DXA BONE DENSITY AXIAL: CPT

## 2025-07-21 PROCEDURE — 77063 BREAST TOMOSYNTHESIS BI: CPT

## (undated) DEVICE — LINER,SEMI-RIGID,3000CC,50EA/CS: Brand: MEDLINE

## (undated) DEVICE — GOWN,REINFORCED,POLY,AURORA,XLARGE,STRL: Brand: MEDLINE

## (undated) DEVICE — REM POLYHESIVE ADULT PATIENT RETURN ELECTRODE: Brand: VALLEYLAB

## (undated) DEVICE — 1230 DOUBLE MAGNET NEEDLE COUNTER,BLACK: Brand: DEVON

## (undated) DEVICE — SOLUTION IRRIG 1000ML STRL H2O USP PLAS POUR BTL

## (undated) DEVICE — BOWL BNE CEM MIX SPAT CURET SMARTMIX CTS

## (undated) DEVICE — 3M™ IOBAN™ 2 ANTIMICROBIAL INCISE DRAPE 6651EZ: Brand: IOBAN™ 2

## (undated) DEVICE — SPONGE: LAP 18X18 W  200/CS: Brand: MEDICAL ACTION INDUSTRIES

## (undated) DEVICE — STAPLER SKIN H3.9MM WIRE DIA0.58MM CRWN 6.9MM 35 STPL ROT

## (undated) DEVICE — SOLUTION IRRIG 1000ML H2O PIC PLAS SHATTERPROOF CONTAINER

## (undated) DEVICE — SNARE ENDOSCP L240CM SHTH DIA2.4MM LOOP W20MM MIN WRK CHN

## (undated) DEVICE — Device

## (undated) DEVICE — STERILE POLYISOPRENE POWDER-FREE SURGICAL GLOVES: Brand: PROTEXIS

## (undated) DEVICE — BLADE SAW ACRION 19MM X 1.27MM X 90MM

## (undated) DEVICE — APPLICATOR SCRB 26ML TEAL STRL -- CHLORAPREP 26ML

## (undated) DEVICE — PAD,ABDOMINAL,8"X10",ST,LF: Brand: MEDLINE

## (undated) DEVICE — SOLUTION IRRIG 3000ML 0.9% SOD CHL ARTHROMATIC PLAS CONT

## (undated) DEVICE — SYR LR LCK 1ML GRAD NSAF 30ML --

## (undated) DEVICE — BLANKET WRM AD PREM MISTRAL-AIR

## (undated) DEVICE — DRAPE SHT 3 QTR PROXIMA 53X77 --

## (undated) DEVICE — INTENDED FOR TISSUE SEPARATION, AND OTHER PROCEDURES THAT REQUIRE A SHARP SURGICAL BLADE TO PUNCTURE OR CUT.: Brand: BARD-PARKER SAFETY BLADES SIZE 11, STERILE

## (undated) DEVICE — SYRINGE 20ML LL S/C 50

## (undated) DEVICE — SOLUTION IRRIG 3000ML LAC RINGERS ARTHROMTC PLAS CONT

## (undated) DEVICE — SKIN MARKER,REGULAR TIP WITH RULER: Brand: DEVON

## (undated) DEVICE — SOCK SPEC SHT 4.5 IN UNIV CANSTR COMPATIBILITY

## (undated) DEVICE — TUBING, SUCTION, 1/4" X 10', STRAIGHT: Brand: MEDLINE

## (undated) DEVICE — ZIMMER® STERILE DISPOSABLE TOURNIQUET CUFF WITH PROTECTIVE SLEEVE AND PLC, DUAL PORT, SINGLE BLADDER, 34 IN. (86 CM)

## (undated) DEVICE — YANKAUER,TAPERED BULBOUS TIP,W/O VENT: Brand: MEDLINE

## (undated) DEVICE — [ARTHROSCOPY PUMP,  DO NOT USE IF PACKAGE IS DAMAGED,  KEEP DRY,  KEEP AWAY FROM SUNLIGHT,  PROTECT FROM HEAT AND RADIOACTIVE SOURCES.]: Brand: FLOSTEADY

## (undated) DEVICE — PADDING CAST W6INXL4YD RAYON UNDERCAST SOF-ROL

## (undated) DEVICE — DRAPE,EXTREMITY,89X128,STERILE: Brand: MEDLINE

## (undated) DEVICE — BIPOLAR SEALER 23-112-1 AQM 6.0: Brand: AQUAMANTYS ®

## (undated) DEVICE — SOL INJ SOD CL 0.9% 250ML BG --

## (undated) DEVICE — ELECTRODE PT RET AD L9FT HI MOIST COND ADH HYDRGEL CORDED

## (undated) DEVICE — GOWN,ISO,KNITCUFF, PREMIUM BLUE, LV3,XL: Brand: MEDLINE INDUSTRIES, INC.

## (undated) DEVICE — PENCIL SMK EVAC ALL IN 1 DSGN ENH VISIBILITY IMPROVED AIR

## (undated) DEVICE — DRESSING HYDROFIBER AQUACEL AG ADVANTAGE 3.5X6 IN

## (undated) DEVICE — SOLUTION IRRIG 1000ML 0.9% SOD CHL USP POUR PLAS BTL

## (undated) DEVICE — AMD ANTIMICROBIAL BANDAGE ROLL,6 PLY: Brand: KERLIX

## (undated) DEVICE — INTENDED FOR TISSUE SEPARATION, AND OTHER PROCEDURES THAT REQUIRE A SHARP SURGICAL BLADE TO PUNCTURE OR CUT.: Brand: BARD-PARKER SAFETY BLADES SIZE 10, STERILE

## (undated) DEVICE — X-RAY DETECTABLE SPONGES,16 PLY: Brand: VISTEC

## (undated) DEVICE — TRAP SPEC POLYP REM STRNR CLN DSGN MAGNIFYING WIND DISP

## (undated) DEVICE — STOCKINETTE,IMPERVIOUS,12X48,STERILE: Brand: MEDLINE

## (undated) DEVICE — NEEDLE HYPO 18GA L1.5IN PNK POLYPR HUB S STL THN WALL FILL

## (undated) DEVICE — PENCIL ES L3M BTTN SWCH S STL HEX LOK BLDE ELECTRD HOLSTER

## (undated) DEVICE — LABEL STERILIZATION W/PEN -- 50/CA

## (undated) DEVICE — WRAP COMPR KNEE PREM W GEL BG

## (undated) DEVICE — 3M™ STERI-DRAPE™ U-DRAPE 1015: Brand: STERI-DRAPE™

## (undated) DEVICE — HOOD: Brand: FLYTE

## (undated) DEVICE — SUTURE VCRL 0 L27IN ABSRB VLT CT L40MM 1/2 CIR TAPERPOINT J352H

## (undated) DEVICE — TUBE SUCT L10IN MINI FLTR CRV KAMVAC

## (undated) DEVICE — SUTURE ETHLN SZ 3-0 L18IN NONABSORBABLE BLK FS-1 L24MM 3/8 663H

## (undated) DEVICE — INFECTION CONTROL KIT SYS

## (undated) DEVICE — PACK,SET UP,NO DRAPES: Brand: MEDLINE

## (undated) DEVICE — GAUZE,SPONGE,4"X4",16PLY,STRL,LF,10/TRAY: Brand: MEDLINE

## (undated) DEVICE — KIT ENDOSCOPIC  PROC VIA

## (undated) DEVICE — COVER,TABLE,44X76,STERILE: Brand: MEDLINE

## (undated) DEVICE — BANDAGE COMPR W6INXL5YD NONSTERILE TAN BRTH SELF ADH WRP W/

## (undated) DEVICE — SUTURE ABSORBABLE BRAIDED 2-0 CT-1 27 IN UD VICRYL J259H

## (undated) DEVICE — [TOMCAT CUTTER, ARTHROSCOPIC SHAVER BLADE,  DO NOT RESTERILIZE,  DO NOT USE IF PACKAGE IS DAMAGED,  KEEP DRY,  KEEP AWAY FROM SUNLIGHT]: Brand: FORMULA

## (undated) DEVICE — CLEANER ES TIP W2XL2IN ADH BK RADPQ FOR S STL ELECTRD

## (undated) DEVICE — BLUNT CANNULA: Brand: MONOJECT

## (undated) DEVICE — SPECIMEN COLLECTION 4-PORT MANIFOLD: Brand: NEPTUNE 2

## (undated) DEVICE — HANDPIECE SET WITH HIGH FLOW TIP AND SUCTION TUBE: Brand: INTERPULSE